# Patient Record
Sex: FEMALE | HISPANIC OR LATINO | Employment: FULL TIME | URBAN - METROPOLITAN AREA
[De-identification: names, ages, dates, MRNs, and addresses within clinical notes are randomized per-mention and may not be internally consistent; named-entity substitution may affect disease eponyms.]

---

## 2022-07-24 ENCOUNTER — HOSPITAL ENCOUNTER (EMERGENCY)
Facility: HOSPITAL | Age: 57
Discharge: HOME | End: 2022-07-24
Attending: STUDENT IN AN ORGANIZED HEALTH CARE EDUCATION/TRAINING PROGRAM | Admitting: STUDENT IN AN ORGANIZED HEALTH CARE EDUCATION/TRAINING PROGRAM

## 2022-07-24 ENCOUNTER — APPOINTMENT (OUTPATIENT)
Dept: CT IMAGING | Facility: HOSPITAL | Age: 57
End: 2022-07-24

## 2022-07-24 VITALS
RESPIRATION RATE: 18 BRPM | SYSTOLIC BLOOD PRESSURE: 144 MMHG | BODY MASS INDEX: 26.61 KG/M2 | HEART RATE: 66 BPM | TEMPERATURE: 98.1 F | OXYGEN SATURATION: 97 % | HEIGHT: 62 IN | DIASTOLIC BLOOD PRESSURE: 79 MMHG | WEIGHT: 144.62 LBS

## 2022-07-24 DIAGNOSIS — R10.32 LEFT LOWER QUADRANT ABDOMINAL PAIN: ICD-10-CM

## 2022-07-24 DIAGNOSIS — A08.4 VIRAL GASTROENTERITIS: Primary | ICD-10-CM

## 2022-07-24 DIAGNOSIS — N20.0 BILATERAL NEPHROLITHIASIS: ICD-10-CM

## 2022-07-24 LAB
ALBUMIN SERPL-MCNC: 4.2 G/DL (ref 3.5–5.2)
ALBUMIN/GLOB SERPL: 1.4 G/DL
ALP SERPL-CCNC: 165 U/L (ref 39–117)
ALT SERPL W P-5'-P-CCNC: 25 U/L (ref 1–33)
ANION GAP SERPL CALCULATED.3IONS-SCNC: 11 MMOL/L (ref 5–15)
AST SERPL-CCNC: 19 U/L (ref 1–32)
BASOPHILS # BLD AUTO: 0.04 10*3/MM3 (ref 0–0.2)
BASOPHILS NFR BLD AUTO: 0.4 % (ref 0–1.5)
BILIRUB SERPL-MCNC: 0.4 MG/DL (ref 0–1.2)
BILIRUB UR QL STRIP: NEGATIVE
BUN SERPL-MCNC: 15 MG/DL (ref 6–20)
BUN/CREAT SERPL: 17.9 (ref 7–25)
CALCIUM SPEC-SCNC: 9.7 MG/DL (ref 8.6–10.5)
CHLORIDE SERPL-SCNC: 105 MMOL/L (ref 98–107)
CLARITY UR: CLEAR
CO2 SERPL-SCNC: 24 MMOL/L (ref 22–29)
COLOR UR: YELLOW
CREAT SERPL-MCNC: 0.84 MG/DL (ref 0.57–1)
D-LACTATE SERPL-SCNC: 1.8 MMOL/L (ref 0.5–2)
DEPRECATED RDW RBC AUTO: 42.5 FL (ref 37–54)
EGFRCR SERPLBLD CKD-EPI 2021: 81.7 ML/MIN/1.73
EOSINOPHIL # BLD AUTO: 0.12 10*3/MM3 (ref 0–0.4)
EOSINOPHIL NFR BLD AUTO: 1.3 % (ref 0.3–6.2)
ERYTHROCYTE [DISTWIDTH] IN BLOOD BY AUTOMATED COUNT: 13 % (ref 12.3–15.4)
GLOBULIN UR ELPH-MCNC: 3 GM/DL
GLUCOSE SERPL-MCNC: 150 MG/DL (ref 65–99)
GLUCOSE UR STRIP-MCNC: ABNORMAL MG/DL
HCT VFR BLD AUTO: 44.4 % (ref 34–46.6)
HGB BLD-MCNC: 14.6 G/DL (ref 12–15.9)
HGB UR QL STRIP.AUTO: NEGATIVE
HOLD SPECIMEN: NORMAL
HOLD SPECIMEN: NORMAL
IMM GRANULOCYTES # BLD AUTO: 0.02 10*3/MM3 (ref 0–0.05)
IMM GRANULOCYTES NFR BLD AUTO: 0.2 % (ref 0–0.5)
KETONES UR QL STRIP: NEGATIVE
LEUKOCYTE ESTERASE UR QL STRIP.AUTO: NEGATIVE
LIPASE SERPL-CCNC: 27 U/L (ref 13–60)
LYMPHOCYTES # BLD AUTO: 2.67 10*3/MM3 (ref 0.7–3.1)
LYMPHOCYTES NFR BLD AUTO: 29.6 % (ref 19.6–45.3)
MCH RBC QN AUTO: 29.5 PG (ref 26.6–33)
MCHC RBC AUTO-ENTMCNC: 32.9 G/DL (ref 31.5–35.7)
MCV RBC AUTO: 89.7 FL (ref 79–97)
MONOCYTES # BLD AUTO: 0.54 10*3/MM3 (ref 0.1–0.9)
MONOCYTES NFR BLD AUTO: 6 % (ref 5–12)
NEUTROPHILS NFR BLD AUTO: 5.62 10*3/MM3 (ref 1.7–7)
NEUTROPHILS NFR BLD AUTO: 62.5 % (ref 42.7–76)
NITRITE UR QL STRIP: NEGATIVE
NRBC BLD AUTO-RTO: 0 /100 WBC (ref 0–0.2)
PH UR STRIP.AUTO: 5.5 [PH] (ref 5–8)
PLATELET # BLD AUTO: 314 10*3/MM3 (ref 140–450)
PMV BLD AUTO: 10.5 FL (ref 6–12)
POTASSIUM SERPL-SCNC: 3.7 MMOL/L (ref 3.5–5.2)
PROT SERPL-MCNC: 7.2 G/DL (ref 6–8.5)
PROT UR QL STRIP: NEGATIVE
RBC # BLD AUTO: 4.95 10*6/MM3 (ref 3.77–5.28)
SODIUM SERPL-SCNC: 140 MMOL/L (ref 136–145)
SP GR UR STRIP: >1.03 (ref 1–1.03)
UROBILINOGEN UR QL STRIP: ABNORMAL
WBC NRBC COR # BLD: 9.01 10*3/MM3 (ref 3.4–10.8)
WHOLE BLOOD HOLD COAG: NORMAL
WHOLE BLOOD HOLD SPECIMEN: NORMAL

## 2022-07-24 PROCEDURE — 0 IOPAMIDOL PER 1 ML: Performed by: STUDENT IN AN ORGANIZED HEALTH CARE EDUCATION/TRAINING PROGRAM

## 2022-07-24 PROCEDURE — 85025 COMPLETE CBC W/AUTO DIFF WBC: CPT

## 2022-07-24 PROCEDURE — 80053 COMPREHEN METABOLIC PANEL: CPT

## 2022-07-24 PROCEDURE — 74177 CT ABD & PELVIS W/CONTRAST: CPT

## 2022-07-24 PROCEDURE — 99283 EMERGENCY DEPT VISIT LOW MDM: CPT

## 2022-07-24 PROCEDURE — 36415 COLL VENOUS BLD VENIPUNCTURE: CPT

## 2022-07-24 PROCEDURE — 83605 ASSAY OF LACTIC ACID: CPT

## 2022-07-24 PROCEDURE — 83690 ASSAY OF LIPASE: CPT

## 2022-07-24 PROCEDURE — 81003 URINALYSIS AUTO W/O SCOPE: CPT | Performed by: STUDENT IN AN ORGANIZED HEALTH CARE EDUCATION/TRAINING PROGRAM

## 2022-07-24 RX ORDER — SODIUM CHLORIDE 0.9 % (FLUSH) 0.9 %
10 SYRINGE (ML) INJECTION AS NEEDED
Status: DISCONTINUED | OUTPATIENT
Start: 2022-07-24 | End: 2022-07-24 | Stop reason: HOSPADM

## 2022-07-24 RX ORDER — ONDANSETRON 4 MG/1
4 TABLET, ORALLY DISINTEGRATING ORAL 4 TIMES DAILY PRN
Qty: 12 TABLET | Refills: 0 | Status: SHIPPED | OUTPATIENT
Start: 2022-07-24

## 2022-07-24 RX ORDER — LOPERAMIDE HYDROCHLORIDE 2 MG/1
2 CAPSULE ORAL 4 TIMES DAILY PRN
Qty: 20 CAPSULE | Refills: 0 | Status: SHIPPED | OUTPATIENT
Start: 2022-07-24 | End: 2022-09-21

## 2022-07-24 RX ADMIN — IOPAMIDOL 100 ML: 755 INJECTION, SOLUTION INTRAVENOUS at 19:52

## 2022-07-24 NOTE — ED PROVIDER NOTES
Time: 7:35 PM EDT  Arrived by: private car  Chief Complaint: Abdominal pain, vomiting, diarrhea  History provided by: Patient  History is limited by: N/A     History of Present Illness:  Patient is a 56 y.o. year old female who presents to the emergency department with abdominal pain, vomiting, diarrhea.    Patient presents the emergency department today complaining of nausea, vomiting, diarrhea, and abdominal pain for the past 3 weeks.  Patient states the abdominal pain is located in the epigastric area and also on the left lower quadrant.  Patient states every time she eats she is having episodes of diarrhea.  Patient states she has vomited 3 times within the past 3 days.  Patient admits to weight loss of greater than 12 pounds since onset of symptoms 3 weeks ago.  Patient states she has normal appetite, but immediately after eating she feels as if she needs to vomit or immediately have a bowel movement.  Patient admits to cholecystectomy 2 years ago.  No other complaints.      History provided by:  Patient   used: No    Nausea  The primary symptoms include weight loss, abdominal pain, nausea, vomiting and diarrhea. Primary symptoms do not include fever, fatigue, melena, hematemesis, hematochezia, dysuria, arthralgias or rash. The illness began more than 7 days ago.   The illness does not include chills, anorexia or constipation.   Vomiting  The primary symptoms include weight loss, abdominal pain, nausea, vomiting and diarrhea. Primary symptoms do not include fever, fatigue, melena, hematemesis, hematochezia, dysuria, arthralgias or rash. The illness began more than 7 days ago.   The illness does not include chills, anorexia or constipation.   Abdominal Pain  Pain location:  Epigastric and LLQ  Pain quality: aching    Pain radiates to:  Does not radiate  Pain severity:  Mild  Onset quality:  Gradual  Duration: 3 weeks.  Timing:  Constant  Progression:  Unchanged  Relieved by:   "Nothing  Worsened by:  Eating  Associated symptoms: diarrhea, nausea and vomiting    Associated symptoms: no anorexia, no belching, no chest pain, no chills, no constipation, no cough, no dysuria, no fatigue, no fever, no flatus, no hematemesis, no hematochezia, no hematuria, no melena, no shortness of breath and no sore throat    Diarrhea  The primary symptoms include weight loss, abdominal pain, nausea, vomiting and diarrhea. Primary symptoms do not include fever, fatigue, melena, hematemesis, hematochezia, dysuria, arthralgias or rash. The illness began more than 7 days ago.   The illness does not include chills, anorexia or constipation.       Similar Symptoms Previously: No  Recently seen: No      Patient Care Team  Primary Care Provider: Carolynn Yanez APRN    Past Medical History:     No Known Allergies  No past medical history on file.  No past surgical history on file.  No family history on file.    Home Medications:  Prior to Admission medications    Not on File        Social History:      Recent travel: no     Review of Systems:  Review of Systems   Constitutional: Positive for weight loss. Negative for chills, fatigue and fever.   HENT: Negative for ear pain and sore throat.    Eyes: Negative for pain.   Respiratory: Negative for cough and shortness of breath.    Cardiovascular: Negative for chest pain.   Gastrointestinal: Positive for abdominal pain, diarrhea, nausea and vomiting. Negative for anorexia, constipation, flatus, hematemesis, hematochezia and melena.   Genitourinary: Negative for dysuria and hematuria.   Musculoskeletal: Negative for arthralgias.   Skin: Negative for rash.   Neurological: Negative for headaches.        Physical Exam:  /79   Pulse 66   Temp 98.1 °F (36.7 °C) (Oral)   Resp 18   Ht 157.5 cm (62\")   Wt 65.6 kg (144 lb 10 oz)   SpO2 97%   BMI 26.45 kg/m²     Physical Exam  Vitals and nursing note reviewed.   Constitutional:       General: She is not in acute " distress.     Appearance: Normal appearance. She is well-developed. She is not ill-appearing.   HENT:      Head: Normocephalic and atraumatic.      Nose: Nose normal.   Eyes:      Extraocular Movements: Extraocular movements intact.      Conjunctiva/sclera: Conjunctivae normal.      Pupils: Pupils are equal, round, and reactive to light.   Cardiovascular:      Rate and Rhythm: Normal rate and regular rhythm.      Heart sounds: Normal heart sounds.   Pulmonary:      Effort: Pulmonary effort is normal.      Breath sounds: Normal breath sounds.   Abdominal:      General: Abdomen is flat. Bowel sounds are normal.      Palpations: Abdomen is soft.      Tenderness: There is abdominal tenderness in the left lower quadrant. There is no guarding or rebound.      Hernia: No hernia is present.   Musculoskeletal:         General: Normal range of motion.      Cervical back: Normal range of motion and neck supple.   Skin:     General: Skin is warm and dry.   Neurological:      General: No focal deficit present.      Mental Status: She is alert and oriented to person, place, and time.   Psychiatric:         Mood and Affect: Mood normal.         Behavior: Behavior normal.         Thought Content: Thought content normal.         Judgment: Judgment normal.                Medications in the Emergency Department:  Medications   sodium chloride 0.9 % flush 10 mL (has no administration in time range)   iopamidol (ISOVUE-370) 76 % injection 100 mL (100 mL Intravenous Given 7/24/22 1952)        Labs  Lab Results (last 24 hours)     Procedure Component Value Units Date/Time    CBC & Differential [[id number]]  (Normal) Collected: 07/24/22 1708    Specimen: Blood Updated: 07/24/22 1723    Narrative:      The following orders were created for panel order CBC & Differential.  Procedure                               Abnormality         Status                     ---------                               -----------         ------                      CBC Auto Differential[[id number]]        Normal              Final result                 Please view results for these tests on the individual orders.    Comprehensive Metabolic Panel [[id number]]  (Abnormal) Collected: 07/24/22 1708    Specimen: Blood Updated: 07/24/22 1746     Glucose 150 mg/dL      BUN 15 mg/dL      Creatinine 0.84 mg/dL      Sodium 140 mmol/L      Potassium 3.7 mmol/L      Chloride 105 mmol/L      CO2 24.0 mmol/L      Calcium 9.7 mg/dL      Total Protein 7.2 g/dL      Albumin 4.20 g/dL      ALT (SGPT) 25 U/L      AST (SGOT) 19 U/L      Alkaline Phosphatase 165 U/L      Total Bilirubin 0.4 mg/dL      Globulin 3.0 gm/dL      A/G Ratio 1.4 g/dL      BUN/Creatinine Ratio 17.9     Anion Gap 11.0 mmol/L      eGFR 81.7 mL/min/1.73      Comment: National Kidney Foundation and American Society of Nephrology (ASN) Task Force recommended calculation based on the Chronic Kidney Disease Epidemiology Collaboration (CKD-EPI) equation refit without adjustment for race.       Narrative:      GFR Normal >60  Chronic Kidney Disease <60  Kidney Failure <15      Lipase [[id number]]  (Normal) Collected: 07/24/22 1708    Specimen: Blood Updated: 07/24/22 1746     Lipase 27 U/L     Lactic Acid, Plasma [[id number]]  (Normal) Collected: 07/24/22 1708    Specimen: Blood Updated: 07/24/22 1740     Lactate 1.8 mmol/L     CBC Auto Differential [[id number]]  (Normal) Collected: 07/24/22 1708    Specimen: Blood Updated: 07/24/22 1723     WBC 9.01 10*3/mm3      RBC 4.95 10*6/mm3      Hemoglobin 14.6 g/dL      Hematocrit 44.4 %      MCV 89.7 fL      MCH 29.5 pg      MCHC 32.9 g/dL      RDW 13.0 %      RDW-SD 42.5 fl      MPV 10.5 fL      Platelets 314 10*3/mm3      Neutrophil % 62.5 %      Lymphocyte % 29.6 %      Monocyte % 6.0 %      Eosinophil % 1.3 %      Basophil % 0.4 %      Immature Grans % 0.2 %      Neutrophils, Absolute 5.62 10*3/mm3      Lymphocytes, Absolute 2.67 10*3/mm3      Monocytes, Absolute 0.54 10*3/mm3       Eosinophils, Absolute 0.12 10*3/mm3      Basophils, Absolute 0.04 10*3/mm3      Immature Grans, Absolute 0.02 10*3/mm3      nRBC 0.0 /100 WBC     Urinalysis With Microscopic If Indicated (No Culture) - Urine, Clean Catch [[id number]]  (Abnormal) Collected: 07/24/22 2024    Specimen: Urine, Clean Catch Updated: 07/24/22 2035     Color, UA Yellow     Appearance, UA Clear     pH, UA 5.5     Specific Gravity, UA >1.030     Glucose, UA >=1000 mg/dL (3+)     Ketones, UA Negative     Bilirubin, UA Negative     Blood, UA Negative     Protein, UA Negative     Leuk Esterase, UA Negative     Nitrite, UA Negative     Urobilinogen, UA 1.0 E.U./dL    Narrative:      Urine microscopic not indicated.           Imaging:  CT Abdomen Pelvis With Contrast    Result Date: 7/24/2022  PROCEDURE: CT ABDOMEN PELVIS W CONTRAST  COMPARISON: None.  INDICATIONS: EPIGASTRIC ABDOMINAL PAIN & LEFT LOWER QUADRANT ABDOMINAL PAIN.  TECHNIQUE: After obtaining the patient's consent, 604 CT images were created with non-ionic intravenous contrast material.  No oral contrast agent was administered for the study.  PROTOCOL:   Standard enhanced CT imaging protocol performed.    RADIATION:   Total DLP: 688mGy*cm.   Automated exposure control was utilized to minimize radiation dose. CONTRAST: 80 mL Isovue 370 I.V. LABS:   eGFR: >60 mL/min/1.73m^2  FINDINGS: No acute findings are identified.  The patient has undergone cholecystectomy.  No acute pancreatitis.  No biliary ductal dilatation.  No mechanical bowel obstruction.  No pathologic bowel wall thickening.  No pneumoperitoneum or pneumatosis.  No portal or mesenteric venous gas.  The appendix is not clearly identified.  Please correlate with the surgical history.  No acute inflammatory changes are seen in the right lower quadrant in the pericecal region.  No acute colitis or diverticulitis.  There are scattered colonic diverticula.  There are renal cysts, which measure about 4.4 cm or less in size.  The  largest involves the posterior upper pole of the right kidney and has benign CT number readings.  No suspicious renal mass is appreciated.  Nonobstructing nephrolithiasis is present on the left with calyceal stones measuring about 7 mm in greatest diameter.  There may be renal cortical scarring bilaterally.  A tiny lower pole calyceal stone is suggested on the right, measuring about 3 mm.  No ureterolithiasis.  No hydronephrosis.  No acute pyelonephritis.  Surgical clips are identified in the right retroperitoneum.  No ascites.  No aneurysmal dilatation of the aortoiliac arterial system.  No acute intraperitoneal or retroperitoneal hemorrhage.  No enlarged intra-abdominal or intrapelvic lymph nodes.  No adrenal mass.  No acute findings are seen with regard to the liver or spleen.  There is diffuse hepatic steatosis without hepatomegaly.  No splenomegaly.  No acute fracture.  No aggressive osseous lesion.  There is possible mild levoscoliosis of the lumbar spine versus positional artifact.  Degenerative changes involve the sacroiliac joints, greater on the right.  There is pubic osteitis.  There are probably benign bone islands involving the left acetabulum and the proximal left femur, estimated at about 1 cm in greatest diameter.  No acute infiltrate is seen in the partially imaged lung bases.  There is chronic calcified granulomatous disease of the chest.  No suspicious uterine or adnexal mass.  There may have been prior bilateral tubal ligation.  No urinary bladder calculi are seen.        No acute findings are seen in the abdomen or pelvis by enhanced CT examination.  Bilateral nonobstructing nephrolithiasis is appreciated.  Please see above comments for further detail.    COMMENT:  Part of this note is an electronic transcription of spoken language to printed text. The electronic translation/transcription may permit erroneous, or at times, nonsensical (or even sensical) words or phrases to be inadvertently  transcribed or omitted; this  has reviewed the note for such errors (as well as additional errors); however, some may still exist.  ELLIE GIBBS JR, MD       Electronically Signed and Approved By: ELLIE GIBBS JR, MD on 7/24/2022 at 20:14                Procedures:  Procedures    Progress                            Medical Decision Making:  MDM  Number of Diagnoses or Management Options  Diagnosis management comments: I have spoken with patient. I have explained the patient´s condition, diagnoses and treatment plan based on the information available to me at this time. I have answered the patient's questions and addressed any concerns. The patient has a good  understanding of the patient´s diagnosis, condition, and treatment plan as can be expected at this point. The vital signs have been stable. The patient´s condition is stable and appropriate for discharge from the emergency department.      The patient will pursue further outpatient evaluation with the primary care physician or other designated or consulting physician as outlined in the discharge instructions. They are agreeable to this plan of care and follow-up instructions have been explained in detail. The patient has received these instructions in written format and have expressed an understanding of the discharge instructions. The patient is aware that any significant change in condition or worsening of symptoms should prompt an immediate return to this or the closest emergency department or call to 911.       Amount and/or Complexity of Data Reviewed  Clinical lab tests: reviewed and ordered  Tests in the radiology section of CPT®: ordered and reviewed    Risk of Complications, Morbidity, and/or Mortality  Presenting problems: moderate  Diagnostic procedures: low  Management options: low    Patient Progress  Patient progress: stable       Final diagnoses:   Viral gastroenteritis   Left lower quadrant abdominal pain   Bilateral  nephrolithiasis        Disposition:  ED Disposition     ED Disposition   Discharge    Condition   Stable    Comment   --             This medical record created using voice recognition software.           Noé Capone PA-C  07/24/22 [id number]

## 2022-07-25 NOTE — DISCHARGE INSTRUCTIONS
Take Zofran as needed for nausea.  Take Imodium as instructed for diarrhea.  I have attached a handout of food choices that may help relieve your diarrhea as well.

## 2022-09-21 ENCOUNTER — OFFICE VISIT (OUTPATIENT)
Dept: GASTROENTEROLOGY | Facility: CLINIC | Age: 57
End: 2022-09-21

## 2022-09-21 VITALS
SYSTOLIC BLOOD PRESSURE: 141 MMHG | BODY MASS INDEX: 26.31 KG/M2 | WEIGHT: 143 LBS | HEIGHT: 62 IN | HEART RATE: 76 BPM | DIASTOLIC BLOOD PRESSURE: 92 MMHG

## 2022-09-21 DIAGNOSIS — K21.9 GASTROESOPHAGEAL REFLUX DISEASE, UNSPECIFIED WHETHER ESOPHAGITIS PRESENT: ICD-10-CM

## 2022-09-21 DIAGNOSIS — R19.7 DIARRHEA, UNSPECIFIED TYPE: Primary | ICD-10-CM

## 2022-09-21 PROBLEM — E11.9 TYPE 2 DIABETES MELLITUS WITHOUT COMPLICATION: Status: ACTIVE | Noted: 2022-09-21

## 2022-09-21 PROBLEM — I10 ESSENTIAL HYPERTENSION: Status: ACTIVE | Noted: 2022-09-21

## 2022-09-21 PROBLEM — E03.9 HYPOTHYROIDISM: Status: ACTIVE | Noted: 2022-09-21

## 2022-09-21 PROBLEM — E78.2 MIXED HYPERLIPIDEMIA: Status: ACTIVE | Noted: 2022-09-21

## 2022-09-21 PROCEDURE — 99204 OFFICE O/P NEW MOD 45 MIN: CPT | Performed by: NURSE PRACTITIONER

## 2022-09-21 RX ORDER — CANAGLIFLOZIN 300 MG/1
TABLET, FILM COATED ORAL
COMMUNITY
Start: 2022-09-17

## 2022-09-21 RX ORDER — LEVOTHYROXINE SODIUM 0.05 MG/1
TABLET ORAL
COMMUNITY
Start: 2022-07-20

## 2022-09-21 RX ORDER — PANTOPRAZOLE SODIUM 40 MG/1
40 TABLET, DELAYED RELEASE ORAL DAILY
Qty: 90 TABLET | Refills: 1 | Status: SHIPPED | OUTPATIENT
Start: 2022-09-21

## 2022-09-21 RX ORDER — SOD SULF/POT CHLORIDE/MAG SULF 1.479 G
12 TABLET ORAL TAKE AS DIRECTED
Qty: 24 TABLET | Refills: 0 | Status: SHIPPED | OUTPATIENT
Start: 2022-09-21

## 2022-09-21 RX ORDER — DULAGLUTIDE 4.5 MG/.5ML
INJECTION, SOLUTION SUBCUTANEOUS
COMMUNITY
Start: 2022-08-24

## 2022-09-21 RX ORDER — DICYCLOMINE HCL 20 MG
20 TABLET ORAL EVERY 6 HOURS PRN
Qty: 120 TABLET | Refills: 1 | Status: SHIPPED | OUTPATIENT
Start: 2022-09-21 | End: 2022-11-23

## 2022-09-21 RX ORDER — MONTELUKAST SODIUM 4 MG/1
TABLET, CHEWABLE ORAL
COMMUNITY
Start: 2022-08-23

## 2022-09-21 RX ORDER — PANTOPRAZOLE SODIUM 40 MG/1
40 TABLET, DELAYED RELEASE ORAL DAILY
COMMUNITY
End: 2022-09-21 | Stop reason: SDUPTHER

## 2022-09-21 RX ORDER — LISINOPRIL 20 MG/1
TABLET ORAL
COMMUNITY
Start: 2022-07-26

## 2022-09-21 NOTE — PROGRESS NOTES
Chief Complaint     Diarrhea    History of Present Illness     Rossy Valdivia is a 57 y.o. female who presents to [facility] on referral from No ref. provider found for a gastroenterology evaluation of diarrhea.      She reports diarrhea that's been present for greater than 20 years.  This is not present daily.  Can occur up to 2-3 times per week.  When present will have 2-3 diarrhea bowel movements per day.  Denies rectal bleeding.  She's taking colestid when she has diarrhea BID.  She feels that it helps some.      Admits intermittent lower abdominal pain when diarrhea is present.      Reports heartburn that's been present for many years.  Reports improvement with protonix.       History      Past Medical History:   Diagnosis Date   • Diabetes mellitus (HCC)    • GERD (gastroesophageal reflux disease)    • Hypertension        History reviewed. No pertinent surgical history.    Family History   Problem Relation Age of Onset   • Colon cancer Neg Hx         Current Medications        Current Outpatient Medications:   •  Invokana 300 MG tablet tablet, , Disp: , Rfl:   •  levothyroxine (SYNTHROID, LEVOTHROID) 50 MCG tablet, TAKE 1 TABLET BY MOUTH EVERY DAY FOR 90 DAYS, Disp: , Rfl:   •  lisinopril (PRINIVIL,ZESTRIL) 20 MG tablet, TAKE 1 TABLET BY MOUTH EVERY DAY FOR 90 DAYS, Disp: , Rfl:   •  ondansetron ODT (ZOFRAN-ODT) 4 MG disintegrating tablet, Place 1 tablet on the tongue 4 (Four) Times a Day As Needed for Nausea or Vomiting., Disp: 12 tablet, Rfl: 0  •  pantoprazole (PROTONIX) 40 MG EC tablet, Take 1 tablet by mouth Daily., Disp: 90 tablet, Rfl: 1  •  Trulicity 4.5 MG/0.5ML solution pen-injector, , Disp: , Rfl:   •  colestipol (COLESTID) 1 g tablet, , Disp: , Rfl:   •  dicyclomine (BENTYL) 20 MG tablet, Take 1 tablet by mouth Every 6 (Six) Hours As Needed (abdominal pain and diarrhea)., Disp: 120 tablet, Rfl: 1  •  Sodium Sulfate-Mag Sulfate-KCl (Sutab) 6207-096-143 MG tablet,  "Take 12 tablets by mouth Take As Directed. Take 12 tablets at 6 pm and 12 tablets 4 hours prior to procedure., Disp: 24 tablet, Rfl: 0     Allergies     No Known Allergies    Social History       Social History     Social History Narrative   • Not on file       Immunizations     Immunization:    There is no immunization history on file for this patient.       Objective     Objective     Vital Signs:   /92 (BP Location: Left arm, Patient Position: Sitting)   Pulse 76   Ht 157.5 cm (62\")   Wt 64.9 kg (143 lb)   BMI 26.16 kg/m²       Physical Exam  Constitutional:       General: She is not in acute distress.     Appearance: Normal appearance. She is well-developed and normal weight.   HENT:      Head: Normocephalic and atraumatic.   Eyes:      Conjunctiva/sclera: Conjunctivae normal.      Pupils: Pupils are equal, round, and reactive to light.      Visual Fields: Right eye visual fields normal and left eye visual fields normal.   Cardiovascular:      Rate and Rhythm: Normal rate and regular rhythm.      Heart sounds: Normal heart sounds.   Pulmonary:      Effort: Pulmonary effort is normal. No retractions.      Breath sounds: Normal breath sounds and air entry.   Abdominal:      General: Bowel sounds are normal.      Palpations: Abdomen is soft.      Tenderness: There is no abdominal tenderness.      Comments: No appreciable hepatosplenomegaly or ascites   Musculoskeletal:         General: Normal range of motion.      Cervical back: Neck supple.      Right lower leg: No edema.      Left lower leg: No edema.   Lymphadenopathy:      Cervical: No cervical adenopathy.   Skin:     General: Skin is warm and dry.      Findings: No lesion.   Neurological:      General: No focal deficit present.      Mental Status: She is alert and oriented to person, place, and time.   Psychiatric:         Mood and Affect: Mood and affect normal.         Behavior: Behavior normal.         Results      Result Review :   The following " data was reviewed by: AMANDA Chapa on 09/21/2022:    CBC w/diff    CBC w/Diff 7/24/22   WBC 9.01   RBC 4.95   Hemoglobin 14.6   Hematocrit 44.4   MCV 89.7   MCH 29.5   MCHC 32.9   RDW 13.0   Platelets 314   Neutrophil Rel % 62.5   Immature Granulocyte Rel % 0.2   Lymphocyte Rel % 29.6   Monocyte Rel % 6.0   Eosinophil Rel % 1.3   Basophil Rel % 0.4           CMP    CMP 7/24/22   Glucose 150 (A)   BUN 15   Creatinine 0.84   Sodium 140   Potassium 3.7   Chloride 105   Calcium 9.7   Albumin 4.20   Total Bilirubin 0.4   Alkaline Phosphatase 165 (A)   AST (SGOT) 19   ALT (SGPT) 25   (A) Abnormal value                     Assessment and Plan        Assessment and Plan    Diagnoses and all orders for this visit:    1. Diarrhea, unspecified type (Primary)  -     Clostridioides difficile Toxin - Stool, Per Rectum; Future  -     Enteric Bacterial Panel - Stool, Per Rectum; Future  -     Enteric Parasite Panel - Stool, Per Rectum; Future  -     Occult Blood, Fecal By Immunoassay - Stool, Per Rectum; Future  -     Fecal Lactoferrin Qual. - Stool, Per Rectum; Future  -     Pancreatic Elastase, Fecal - Stool, Per Rectum; Future  -     Case Request; Standing  -     Case Request  -     dicyclomine (BENTYL) 20 MG tablet; Take 1 tablet by mouth Every 6 (Six) Hours As Needed (abdominal pain and diarrhea).  Dispense: 120 tablet; Refill: 1    2. Gastroesophageal reflux disease, unspecified whether esophagitis present  -     pantoprazole (PROTONIX) 40 MG EC tablet; Take 1 tablet by mouth Daily.  Dispense: 90 tablet; Refill: 1  -     Case Request; Standing  -     Case Request    Other orders  -     Follow Anesthesia Guidelines / Protocol; Future  -     Sodium Sulfate-Mag Sulfate-KCl (Sutab) 9239-561-073 MG tablet; Take 12 tablets by mouth Take As Directed. Take 12 tablets at 6 pm and 12 tablets 4 hours prior to procedure.  Dispense: 24 tablet; Refill: 0        ESOPHAGOGASTRODUODENOSCOPY (N/A), COLONOSCOPY (N/A)  The risk of  the endoscopy were discussed in detail. Possible risks/complications, benefits, and alternatives to surgical or invasive procedure have been explained to patient and/or legal guardian; risks include bleeding, infection, and perforation. Patient has been evaluated and can tolerate anesthesia and/or sedation.       Follow Up        Follow Up   Return for F/U after procedure.  Patient was given instructions and counseling regarding her condition or for health maintenance advice. Please see specific information pulled into the AVS if appropriate.

## 2022-10-03 ENCOUNTER — LAB (OUTPATIENT)
Dept: LAB | Facility: HOSPITAL | Age: 57
End: 2022-10-03

## 2022-10-03 DIAGNOSIS — R19.7 DIARRHEA, UNSPECIFIED TYPE: ICD-10-CM

## 2022-10-03 LAB
027 TOXIN: NORMAL
C DIFF TOX GENS STL QL NAA+PROBE: NEGATIVE
HEMOCCULT STL QL IA: NEGATIVE
LACTOFERRIN STL QL LA: NEGATIVE

## 2022-10-03 PROCEDURE — 82274 ASSAY TEST FOR BLOOD FECAL: CPT

## 2022-10-03 PROCEDURE — 83630 LACTOFERRIN FECAL (QUAL): CPT

## 2022-10-03 PROCEDURE — 87506 IADNA-DNA/RNA PROBE TQ 6-11: CPT

## 2022-10-03 PROCEDURE — 87493 C DIFF AMPLIFIED PROBE: CPT

## 2022-10-03 PROCEDURE — 82653 EL-1 FECAL QUANTITATIVE: CPT

## 2022-10-04 LAB
C COLI+JEJ+UPSA DNA STL QL NAA+NON-PROBE: NOT DETECTED
CRYPTOSP DNA STL QL NAA+NON-PROBE: NOT DETECTED
E HISTOLYT DNA STL QL NAA+NON-PROBE: NOT DETECTED
EC STX1+STX2 GENES STL QL NAA+NON-PROBE: NOT DETECTED
G LAMBLIA DNA STL QL NAA+NON-PROBE: NOT DETECTED
S ENT+BONG DNA STL QL NAA+NON-PROBE: NOT DETECTED
SHIGELLA SP+EIEC IPAH ST NAA+NON-PROBE: NOT DETECTED

## 2022-10-08 LAB — ELASTASE PANC STL-MCNT: <50 UG ELAST./G

## 2022-10-10 ENCOUNTER — TELEPHONE (OUTPATIENT)
Dept: GASTROENTEROLOGY | Facility: CLINIC | Age: 57
End: 2022-10-10

## 2022-10-10 RX ORDER — PANCRELIPASE 36000; 180000; 114000 [USP'U]/1; [USP'U]/1; [USP'U]/1
36000 CAPSULE, DELAYED RELEASE PELLETS ORAL 4 TIMES DAILY
Qty: 360 CAPSULE | Refills: 1 | Status: SHIPPED | OUTPATIENT
Start: 2022-10-10 | End: 2023-01-08

## 2022-10-10 NOTE — TELEPHONE ENCOUNTER
----- Message from AMANDA Chapa sent at 10/10/2022  1:18 PM EDT -----  Please let patient know that stool studies are available pancreatic insufficiency.  This is likely contributing to her diarrhea.  Recommend pancreatic enzymes with each meal.  Rx sent to patient's pharmacy.

## 2022-11-02 ENCOUNTER — OFFICE VISIT (OUTPATIENT)
Dept: UROLOGY | Facility: CLINIC | Age: 57
End: 2022-11-02

## 2022-11-02 VITALS — WEIGHT: 142.4 LBS | HEIGHT: 62 IN | BODY MASS INDEX: 26.2 KG/M2 | RESPIRATION RATE: 16 BRPM

## 2022-11-02 DIAGNOSIS — N20.0 KIDNEY STONES: Primary | ICD-10-CM

## 2022-11-02 LAB
BILIRUB BLD-MCNC: NEGATIVE MG/DL
CLARITY, POC: CLEAR
COLOR UR: YELLOW
EXPIRATION DATE: ABNORMAL
GLUCOSE UR STRIP-MCNC: ABNORMAL MG/DL
KETONES UR QL: NEGATIVE
LEUKOCYTE EST, POC: NEGATIVE
Lab: ABNORMAL
NITRITE UR-MCNC: NEGATIVE MG/ML
PH UR: 5.5 [PH] (ref 5–8)
PROT UR STRIP-MCNC: NEGATIVE MG/DL
RBC # UR STRIP: ABNORMAL /UL
SP GR UR: 1.02 (ref 1–1.03)
UROBILINOGEN UR QL: ABNORMAL

## 2022-11-02 PROCEDURE — 99203 OFFICE O/P NEW LOW 30 MIN: CPT | Performed by: UROLOGY

## 2022-11-02 NOTE — PROGRESS NOTES
"Chief Complaint  Kidney stones    Subjective          Rossy Valdivia presents to [facility] UROLOGY  History of Present Illness     The patient states she is doing well. She reports she has had kidney stones since 1989. She states she does not typically pass them. The patient reports she has had surgery on her kidney a lot. She states her last surgery was in 03/2021 and 04/2021. The patient reports she had laser surgery on her left kidney and they had to put her in the hospital. She states she had another surgery on her right kidney because they were 17 mm. The patient reports she has been having pain for the last 2 days.    Objective   Vital Signs:   Resp 16   Ht 157.5 cm (62\")   Wt 64.6 kg (142 lb 6.4 oz)   BMI 26.05 kg/m²       Physical Exam  Vitals and nursing note reviewed.   Constitutional:       Appearance: Normal appearance. She is well-developed.   Pulmonary:      Effort: Pulmonary effort is normal.      Breath sounds: Normal air entry.   Neurological:      Mental Status: She is alert and oriented to person, place, and time.      Motor: Motor function is intact.   Psychiatric:         Mood and Affect: Mood normal.         Behavior: Behavior normal.          Result Review :       Results for orders placed or performed in visit on 11/02/22   POC Urinalysis Dipstick, Automated    Specimen: Urine   Result Value Ref Range    Color Yellow Yellow, Straw, Dark Yellow, Jennifer    Clarity, UA Clear Clear    Specific Gravity  1.025 1.005 - 1.030    pH, Urine 5.5 5.0 - 8.0    Leukocytes Negative Negative    Nitrite, UA Negative Negative    Protein, POC Negative Negative mg/dL    Glucose, UA >=1000 mg/dL (3+) (A) Negative mg/dL    Ketones, UA Negative Negative    Urobilinogen, UA 0.2 E.U./dL Normal, 0.2 E.U./dL    Bilirubin Negative Negative    Blood, UA Trace (A) Negative    Lot Number [id number]     Expiration Date 102,023        Data reviewed: Radiologic studies CT scan:   Study Result    Narrative & " Impression   PROCEDURE:  CT ABDOMEN PELVIS W CONTRAST     COMPARISON: None.     INDICATIONS:  EPIGASTRIC ABDOMINAL PAIN & LEFT LOWER QUADRANT ABDOMINAL PAIN.     TECHNIQUE:    After obtaining the patient's consent, 604 CT images were created with non-ionic   intravenous contrast material.  No oral contrast agent was administered for the study.     PROTOCOL:     Standard enhanced CT imaging protocol performed.                 RADIATION:      Total DLP: 688mGy*cm.               Automated exposure control was utilized to minimize radiation dose.   CONTRAST:      80 mL Isovue 370 I.V.  LABS:   eGFR: >60 mL/min/1.73m^2     FINDINGS:        No acute findings are identified.  The patient has undergone cholecystectomy.  No acute   pancreatitis.  No biliary ductal dilatation.  No mechanical bowel obstruction.  No pathologic bowel   wall thickening.  No pneumoperitoneum or pneumatosis.  No portal or mesenteric venous gas.  The   appendix is not clearly identified.  Please correlate with the surgical history.  No acute   inflammatory changes are seen in the right lower quadrant in the pericecal region.  No acute   colitis or diverticulitis.  There are scattered colonic diverticula.  There are renal cysts, which   measure about 4.4 cm or less in size.  The largest involves the posterior upper pole of the right   kidney and has benign CT number readings.  No suspicious renal mass is appreciated.  Nonobstructing   nephrolithiasis is present on the left with calyceal stones measuring about 7 mm in greatest   diameter.  There may be renal cortical scarring bilaterally.  A tiny lower pole calyceal stone is   suggested on the right, measuring about 3 mm.  No ureterolithiasis.  No hydronephrosis.  No acute   pyelonephritis.  Surgical clips are identified in the right retroperitoneum.  No ascites.  No   aneurysmal dilatation of the aortoiliac arterial system.  No acute intraperitoneal or   retroperitoneal hemorrhage.  No enlarged  intra-abdominal or intrapelvic lymph nodes.  No adrenal   mass.  No acute findings are seen with regard to the liver or spleen.  There is diffuse hepatic   steatosis without hepatomegaly.  No splenomegaly.  No acute fracture.  No aggressive osseous   lesion.  There is possible mild levoscoliosis of the lumbar spine versus positional artifact.    Degenerative changes involve the sacroiliac joints, greater on the right.  There is pubic osteitis.    There are probably benign bone islands involving the left acetabulum and the proximal left femur,   estimated at about 1 cm in greatest diameter.  No acute infiltrate is seen in the partially imaged   lung bases.  There is chronic calcified granulomatous disease of the chest.  No suspicious uterine   or adnexal mass.  There may have been prior bilateral tubal ligation.  No urinary bladder calculi   are seen.     IMPRESSION:                 No acute findings are seen in the abdomen or pelvis by enhanced CT examination.  Bilateral   nonobstructing nephrolithiasis is appreciated.  Please see above comments for further detail.           COMMENT:  Part of this note is an electronic transcription of spoken language to printed text. The   electronic translation/transcription may permit erroneous, or at times, nonsensical (or even   sensical) words or phrases to be inadvertently transcribed or omitted; this  has   reviewed the note for such errors (as well as additional errors); however, some may still exist.     ELLIE GIBBS JR, MD         Electronically Signed and Approved By: ELLIE GIBBS JR, MD on 7/24/2022 at 20:14               Assessment and Plan    Diagnoses and all orders for this visit:    1. Kidney stones (Primary)  Comments:  She will have a KUB,will call her with those results. If she is not passing any stones currently, we will plan on seeing her back in 6 months with a KUB.  Orders:  -     POC Urinalysis Dipstick, Automated  -     XR Abdomen KUB;  Future  -     XR Abdomen KUB; Future            Follow Up       Return in about 6 months (around 5/2/2023).  Patient was given instructions and counseling regarding her condition or for health maintenance advice. Please see specific information pulled into the AVS if appropriate.     Transcribed from ambient dictation for Patricia Garcia MD by Salome Bright.  11/02/22   14:34 EDT    Patient or patient representative verbalized consent to the visit recording.  I have personally performed the services described in this document as transcribed by the above individual, and it is both accurate and complete.

## 2022-11-07 ENCOUNTER — HOSPITAL ENCOUNTER (OUTPATIENT)
Dept: GENERAL RADIOLOGY | Facility: HOSPITAL | Age: 57
Discharge: HOME | End: 2022-11-07
Admitting: UROLOGY

## 2022-11-07 DIAGNOSIS — N20.0 KIDNEY STONES: ICD-10-CM

## 2022-11-07 PROCEDURE — 74018 RADEX ABDOMEN 1 VIEW: CPT

## 2022-11-23 DIAGNOSIS — R19.7 DIARRHEA, UNSPECIFIED TYPE: ICD-10-CM

## 2022-11-23 RX ORDER — DICYCLOMINE HCL 20 MG
20 TABLET ORAL EVERY 6 HOURS PRN
Qty: 120 TABLET | Refills: 1 | Status: SHIPPED | OUTPATIENT
Start: 2022-11-23

## 2022-12-14 ENCOUNTER — APPOINTMENT (OUTPATIENT)
Dept: GENERAL RADIOLOGY | Facility: HOSPITAL | Age: 57
End: 2022-12-14

## 2022-12-14 ENCOUNTER — HOSPITAL ENCOUNTER (EMERGENCY)
Facility: HOSPITAL | Age: 57
Discharge: HOME | End: 2022-12-14
Attending: EMERGENCY MEDICINE | Admitting: EMERGENCY MEDICINE

## 2022-12-14 VITALS
HEIGHT: 62 IN | BODY MASS INDEX: 26.29 KG/M2 | WEIGHT: 142.86 LBS | TEMPERATURE: 98.6 F | OXYGEN SATURATION: 99 % | RESPIRATION RATE: 20 BRPM | DIASTOLIC BLOOD PRESSURE: 87 MMHG | HEART RATE: 96 BPM | SYSTOLIC BLOOD PRESSURE: 133 MMHG

## 2022-12-14 DIAGNOSIS — J40 BRONCHITIS: Primary | ICD-10-CM

## 2022-12-14 LAB
ALBUMIN SERPL-MCNC: 3.9 G/DL (ref 3.5–5.2)
ALBUMIN/GLOB SERPL: 1.1 G/DL
ALP SERPL-CCNC: 180 U/L (ref 39–117)
ALT SERPL W P-5'-P-CCNC: 32 U/L (ref 1–33)
ANION GAP SERPL CALCULATED.3IONS-SCNC: 10.2 MMOL/L (ref 5–15)
AST SERPL-CCNC: 13 U/L (ref 1–32)
BASOPHILS # BLD AUTO: 0.03 10*3/MM3 (ref 0–0.2)
BASOPHILS NFR BLD AUTO: 0.3 % (ref 0–1.5)
BILIRUB SERPL-MCNC: 0.3 MG/DL (ref 0–1.2)
BUN SERPL-MCNC: 15 MG/DL (ref 6–20)
BUN/CREAT SERPL: 18.1 (ref 7–25)
CALCIUM SPEC-SCNC: 10.4 MG/DL (ref 8.6–10.5)
CHLORIDE SERPL-SCNC: 105 MMOL/L (ref 98–107)
CO2 SERPL-SCNC: 27.8 MMOL/L (ref 22–29)
CREAT SERPL-MCNC: 0.83 MG/DL (ref 0.57–1)
DEPRECATED RDW RBC AUTO: 41.9 FL (ref 37–54)
EGFRCR SERPLBLD CKD-EPI 2021: 82.3 ML/MIN/1.73
EOSINOPHIL # BLD AUTO: 0.11 10*3/MM3 (ref 0–0.4)
EOSINOPHIL NFR BLD AUTO: 1.1 % (ref 0.3–6.2)
ERYTHROCYTE [DISTWIDTH] IN BLOOD BY AUTOMATED COUNT: 12.8 % (ref 12.3–15.4)
FLUAV AG NPH QL: NEGATIVE
FLUBV AG NPH QL IA: NEGATIVE
GLOBULIN UR ELPH-MCNC: 3.4 GM/DL
GLUCOSE SERPL-MCNC: 260 MG/DL (ref 65–99)
HCT VFR BLD AUTO: 43.3 % (ref 34–46.6)
HGB BLD-MCNC: 14.1 G/DL (ref 12–15.9)
HOLD SPECIMEN: NORMAL
HOLD SPECIMEN: NORMAL
IMM GRANULOCYTES # BLD AUTO: 0.04 10*3/MM3 (ref 0–0.05)
IMM GRANULOCYTES NFR BLD AUTO: 0.4 % (ref 0–0.5)
LYMPHOCYTES # BLD AUTO: 2.62 10*3/MM3 (ref 0.7–3.1)
LYMPHOCYTES NFR BLD AUTO: 25.9 % (ref 19.6–45.3)
MCH RBC QN AUTO: 29.4 PG (ref 26.6–33)
MCHC RBC AUTO-ENTMCNC: 32.6 G/DL (ref 31.5–35.7)
MCV RBC AUTO: 90.4 FL (ref 79–97)
MONOCYTES # BLD AUTO: 0.79 10*3/MM3 (ref 0.1–0.9)
MONOCYTES NFR BLD AUTO: 7.8 % (ref 5–12)
NEUTROPHILS NFR BLD AUTO: 6.52 10*3/MM3 (ref 1.7–7)
NEUTROPHILS NFR BLD AUTO: 64.5 % (ref 42.7–76)
NRBC BLD AUTO-RTO: 0 /100 WBC (ref 0–0.2)
NT-PROBNP SERPL-MCNC: <36 PG/ML (ref 0–900)
PLATELET # BLD AUTO: 324 10*3/MM3 (ref 140–450)
PMV BLD AUTO: 11.4 FL (ref 6–12)
POTASSIUM SERPL-SCNC: 4.5 MMOL/L (ref 3.5–5.2)
PROT SERPL-MCNC: 7.3 G/DL (ref 6–8.5)
QT INTERVAL: 319 MS
RBC # BLD AUTO: 4.79 10*6/MM3 (ref 3.77–5.28)
SARS-COV-2 RNA PNL SPEC NAA+PROBE: NOT DETECTED
SODIUM SERPL-SCNC: 143 MMOL/L (ref 136–145)
TROPONIN T SERPL-MCNC: <0.01 NG/ML (ref 0–0.03)
WBC NRBC COR # BLD: 10.11 10*3/MM3 (ref 3.4–10.8)
WHOLE BLOOD HOLD COAG: NORMAL
WHOLE BLOOD HOLD SPECIMEN: NORMAL

## 2022-12-14 PROCEDURE — 87804 INFLUENZA ASSAY W/OPTIC: CPT

## 2022-12-14 PROCEDURE — 80053 COMPREHEN METABOLIC PANEL: CPT

## 2022-12-14 PROCEDURE — 93010 ELECTROCARDIOGRAM REPORT: CPT | Performed by: INTERNAL MEDICINE

## 2022-12-14 PROCEDURE — 36415 COLL VENOUS BLD VENIPUNCTURE: CPT

## 2022-12-14 PROCEDURE — U0004 COV-19 TEST NON-CDC HGH THRU: HCPCS

## 2022-12-14 PROCEDURE — 93005 ELECTROCARDIOGRAM TRACING: CPT

## 2022-12-14 PROCEDURE — 63710000001 PREDNISONE PER 5 MG: Performed by: NURSE PRACTITIONER

## 2022-12-14 PROCEDURE — 99283 EMERGENCY DEPT VISIT LOW MDM: CPT

## 2022-12-14 PROCEDURE — 71045 X-RAY EXAM CHEST 1 VIEW: CPT

## 2022-12-14 PROCEDURE — 84484 ASSAY OF TROPONIN QUANT: CPT

## 2022-12-14 PROCEDURE — 83880 ASSAY OF NATRIURETIC PEPTIDE: CPT

## 2022-12-14 PROCEDURE — 93005 ELECTROCARDIOGRAM TRACING: CPT | Performed by: EMERGENCY MEDICINE

## 2022-12-14 PROCEDURE — 85025 COMPLETE CBC W/AUTO DIFF WBC: CPT

## 2022-12-14 RX ORDER — PREDNISONE 20 MG/1
60 TABLET ORAL DAILY
Qty: 15 TABLET | Refills: 0 | Status: SHIPPED | OUTPATIENT
Start: 2022-12-14 | End: 2022-12-19

## 2022-12-14 RX ORDER — BENZONATATE 200 MG/1
200 CAPSULE ORAL 3 TIMES DAILY PRN
Qty: 20 CAPSULE | Refills: 0 | Status: SHIPPED | OUTPATIENT
Start: 2022-12-14

## 2022-12-14 RX ORDER — AZITHROMYCIN 250 MG/1
TABLET, FILM COATED ORAL
Qty: 6 TABLET | Refills: 0 | Status: SHIPPED | OUTPATIENT
Start: 2022-12-14

## 2022-12-14 RX ORDER — ALBUTEROL SULFATE 90 UG/1
2 AEROSOL, METERED RESPIRATORY (INHALATION) EVERY 6 HOURS PRN
Qty: 18 G | Refills: 0 | Status: SHIPPED | OUTPATIENT
Start: 2022-12-14

## 2022-12-14 RX ORDER — SODIUM CHLORIDE 0.9 % (FLUSH) 0.9 %
10 SYRINGE (ML) INJECTION AS NEEDED
Status: DISCONTINUED | OUTPATIENT
Start: 2022-12-14 | End: 2022-12-14 | Stop reason: HOSPADM

## 2022-12-14 RX ADMIN — PREDNISONE 60 MG: 50 TABLET ORAL at 05:30

## 2022-12-14 NOTE — DISCHARGE INSTRUCTIONS
Take medications as prescribed.    Follow-up with your PCP if no better    Return for new or worsening symptoms

## 2022-12-14 NOTE — ED PROVIDER NOTES
Time: 06:25 EST  Arrived by: Vehicle  Chief Complaint: Cough  History provided by: Patient  History is limited by: N/A    History of Present Illness:  Patient is a 57 y.o. year old female that presents to the emergency department with cough      History provided by:  Patient  Cough  Cough characteristics:  Non-productive  Severity:  Moderate  Onset quality:  Gradual  Duration:  2 weeks  Timing:  Constant  Progression:  Waxing and waning  Chronicity:  New  Smoker: no    Context: upper respiratory infection    Relieved by:  Nothing  Worsened by:  Activity, lying down and deep breathing  Ineffective treatments:  None tried  Associated symptoms: chest pain ( chest sore with cough), rhinorrhea, shortness of breath ( At times) and wheezing ( At times)    Associated symptoms: no chills, no diaphoresis, no ear fullness, no ear pain, no eye discharge, no fever, no headaches, no myalgias, no rash, no sinus congestion and no sore throat    Risk factors: no recent infection and no recent travel            Similar Symptoms Previously: No  Recently seen: No      Patient Care Team  Primary Care Provider: Carolynn Yanez    Past Medical History:     No Known Allergies  Past Medical History:   Diagnosis Date   • Diabetes mellitus (HCC)    • GERD (gastroesophageal reflux disease)    • Hypertension      History reviewed. No pertinent surgical history.  Family History   Problem Relation Age of Onset   • Colon cancer Neg Hx        Home Medications:  Prior to Admission medications    Medication Sig Start Date End Date Taking? Authorizing Provider   colestipol (COLESTID) 1 g tablet  8/23/22   Princess Joaquin MD   dicyclomine (BENTYL) 20 MG tablet TAKE 1 TABLET BY MOUTH EVERY 6 (SIX) HOURS AS NEEDED (ABDOMINAL PAIN AND DIARRHEA). 11/23/22   Christi Marks APRN   Invokana 300 MG tablet tablet  9/17/22   Princess Joaquin MD   levothyroxine (SYNTHROID, LEVOTHROID) 50 MCG tablet TAKE 1 TABLET BY MOUTH EVERY DAY FOR 90 DAYS  7/20/22   Provider, MD Princess   lisinopril (PRINIVIL,ZESTRIL) 20 MG tablet TAKE 1 TABLET BY MOUTH EVERY DAY FOR 90 DAYS 7/26/22   Princess Joaquin MD   ondansetron ODT (ZOFRAN-ODT) 4 MG disintegrating tablet Place 1 tablet on the tongue 4 (Four) Times a Day As Needed for Nausea or Vomiting. 7/24/22   Noé Capone PA-C   Pancrelipase, Lip-Prot-Amyl, (Creon) 92114-602618 units capsule delayed-release particles capsule Take 1 capsule by mouth 4 (Four) Times a Day for 90 days. Take 2 with first bite of food.  1 with snacks 10/10/22 1/8/23  Christi Marks APRN   pantoprazole (PROTONIX) 40 MG EC tablet Take 1 tablet by mouth Daily. 9/21/22   Christi Marks APRN   Sodium Sulfate-Mag Sulfate-KCl (Sutab) 7360-743-907 MG tablet Take 12 tablets by mouth Take As Directed. Take 12 tablets at 6 pm and 12 tablets 4 hours prior to procedure. 9/21/22   Christi Marks APRN   Trulicity 4.5 MG/0.5ML solution pen-injector  8/24/22   Provider, MD Princess        Social History:   PT  reports that she has never smoked. She has never used smokeless tobacco. She reports that she does not drink alcohol and does not use drugs.    Record Review:  I have reviewed the patient's records in [facility].     Review of Systems  Review of Systems   Constitutional: Negative for chills, diaphoresis, fatigue and fever.   HENT: Positive for rhinorrhea. Negative for ear pain and sore throat.    Eyes: Negative for discharge and visual disturbance.   Respiratory: Positive for cough, shortness of breath ( At times) and wheezing ( At times).    Cardiovascular: Positive for chest pain ( chest sore with cough).   Gastrointestinal: Negative for abdominal pain, diarrhea and vomiting.   Genitourinary: Negative for difficulty urinating.   Musculoskeletal: Negative for arthralgias, back pain and myalgias.   Skin: Negative for rash.   Neurological: Negative for light-headedness and headaches.   Hematological: Negative for adenopathy.  "  Psychiatric/Behavioral: Negative.         Physical Exam  /79 (BP Location: Left arm, Patient Position: Sitting)   Pulse 109   Temp 98.6 °F (37 °C) (Oral)   Resp 20   Ht 157.5 cm (62\")   Wt 64.8 kg (142 lb 13.7 oz)   SpO2 100%   BMI 26.13 kg/m²     Physical Exam  Vitals and nursing note reviewed.   Constitutional:       General: She is not in acute distress.     Appearance: Normal appearance. She is not toxic-appearing.   HENT:      Head: Normocephalic and atraumatic.      Right Ear: Tympanic membrane, ear canal and external ear normal.      Left Ear: Tympanic membrane, ear canal and external ear normal.      Nose: Nose normal.      Mouth/Throat:      Mouth: Mucous membranes are moist.   Eyes:      Conjunctiva/sclera: Conjunctivae normal.   Cardiovascular:      Rate and Rhythm: Regular rhythm. Tachycardia present.      Pulses: Normal pulses.      Heart sounds: Normal heart sounds.   Pulmonary:      Effort: Pulmonary effort is normal.      Breath sounds: Normal breath sounds.      Comments: Dry hacking cough.  Bronchospasm noted with deep breath  Chest:      Chest wall: Tenderness ( Diffuse anterior chest wall tenderness) present.   Abdominal:      General: Bowel sounds are normal.      Palpations: Abdomen is soft.      Tenderness: There is no abdominal tenderness. There is no right CVA tenderness or left CVA tenderness.   Musculoskeletal:         General: Normal range of motion.      Cervical back: Normal range of motion.   Skin:     General: Skin is warm and dry.   Neurological:      General: No focal deficit present.      Mental Status: She is alert and oriented to person, place, and time.   Psychiatric:         Mood and Affect: Mood normal.         Behavior: Behavior normal.         Thought Content: Thought content normal.         Judgment: Judgment normal.                  ED Course  /79 (BP Location: Left arm, Patient Position: Sitting)   Pulse 109   Temp 98.6 °F (37 °C) (Oral)   Resp 20  " " Ht 157.5 cm (62\")   Wt 64.8 kg (142 lb 13.7 oz)   SpO2 100%   BMI 26.13 kg/m²   Results for orders placed or performed during the hospital encounter of 12/14/22   Influenza Antigen, Rapid - Swab, Nasopharynx    Specimen: Nasopharynx; Swab   Result Value Ref Range    Influenza A Ag, EIA Negative Negative    Influenza B Ag, EIA Negative Negative   COVID-19,APTIMA PANTHER(LEXIE), PAUL/ LELA, NP/OP SWAB IN UTM/VTM/[facility] TRANSPORT MEDIA,24 HR TAT - Swab, Nasopharynx    Specimen: Nasopharynx; Swab   Result Value Ref Range    COVID19 Not Detected Not Detected - Ref. Range   Comprehensive Metabolic Panel    Specimen: Blood   Result Value Ref Range    Glucose 260 (H) 65 - 99 mg/dL    BUN 15 6 - 20 mg/dL    Creatinine 0.83 0.57 - 1.00 mg/dL    Sodium 143 136 - 145 mmol/L    Potassium 4.5 3.5 - 5.2 mmol/L    Chloride 105 98 - 107 mmol/L    CO2 27.8 22.0 - 29.0 mmol/L    Calcium 10.4 8.6 - 10.5 mg/dL    Total Protein 7.3 6.0 - 8.5 g/dL    Albumin 3.90 3.50 - 5.20 g/dL    ALT (SGPT) 32 1 - 33 U/L    AST (SGOT) 13 1 - 32 U/L    Alkaline Phosphatase 180 (H) 39 - 117 U/L    Total Bilirubin 0.3 0.0 - 1.2 mg/dL    Globulin 3.4 gm/dL    A/G Ratio 1.1 g/dL    BUN/Creatinine Ratio 18.1 7.0 - 25.0    Anion Gap 10.2 5.0 - 15.0 mmol/L    eGFR 82.3 >60.0 mL/min/1.73   BNP    Specimen: Blood   Result Value Ref Range    proBNP <36.0 0.0 - 900.0 pg/mL   Troponin    Specimen: Blood   Result Value Ref Range    Troponin T <0.010 0.000 - 0.030 ng/mL   CBC Auto Differential    Specimen: Blood   Result Value Ref Range    WBC 10.11 3.40 - 10.80 10*3/mm3    RBC 4.79 3.77 - 5.28 10*6/mm3    Hemoglobin 14.1 12.0 - 15.9 g/dL    Hematocrit 43.3 34.0 - 46.6 %    MCV 90.4 79.0 - 97.0 fL    MCH 29.4 26.6 - 33.0 pg    MCHC 32.6 31.5 - 35.7 g/dL    RDW 12.8 12.3 - 15.4 %    RDW-SD 41.9 37.0 - 54.0 fl    MPV 11.4 6.0 - 12.0 fL    Platelets 324 140 - 450 10*3/mm3    Neutrophil % 64.5 42.7 - 76.0 %    Lymphocyte % 25.9 19.6 - 45.3 %    Monocyte % 7.8 5.0 - " 12.0 %    Eosinophil % 1.1 0.3 - 6.2 %    Basophil % 0.3 0.0 - 1.5 %    Immature Grans % 0.4 0.0 - 0.5 %    Neutrophils, Absolute 6.52 1.70 - 7.00 10*3/mm3    Lymphocytes, Absolute 2.62 0.70 - 3.10 10*3/mm3    Monocytes, Absolute 0.79 0.10 - 0.90 10*3/mm3    Eosinophils, Absolute 0.11 0.00 - 0.40 10*3/mm3    Basophils, Absolute 0.03 0.00 - 0.20 10*3/mm3    Immature Grans, Absolute 0.04 0.00 - 0.05 10*3/mm3    nRBC 0.0 0.0 - 0.2 /100 WBC   ECG 12 Lead ED Triage Standing Order; SOA   Result Value Ref Range    QT Interval 319 ms   Green Top (Gel)   Result Value Ref Range    Extra Tube Hold for add-ons.    Lavender Top   Result Value Ref Range    Extra Tube hold for add-on    Gold Top - SST   Result Value Ref Range    Extra Tube Hold for add-ons.    Light Blue Top   Result Value Ref Range    Extra Tube Hold for add-ons.      Medications   sodium chloride 0.9 % flush 10 mL (has no administration in time range)   predniSONE (DELTASONE) tablet 60 mg (60 mg Oral Given 12/14/22 0530)     XR Chest 1 View    Result Date: 12/14/2022  Narrative: PROCEDURE: XR CHEST 1 VW  COMPARISON: None.  INDICATIONS: SHORT OF BREATH; COUGH.  FINDINGS: A single frontal (AP or PA upright portable) chest radiograph reveals no cardiac enlargement and no acute infiltrate. No pneumothorax is seen.  Chronic calcified granulomatous disease may involve the chest.  Patient has undergone cholecystectomy.  Mild biapical pleural-parenchymal scarring is possible.      Impression:  No acute cardiopulmonary disease is appreciated radiographically.       Please note that portions of this note were completed with a voice recognition program.  ELLIE GIBBS JR, MD       Electronically Signed and Approved By: ELLIE GIBBS JR, MD on 12/14/2022 at 2:56                Procedures/EKGs:  Procedures    EKG:  Narrative & Impression    HEART RATE= 92  bpm  RR Interval= 652  ms  VA Interval= 151  ms  P Horizontal Axis= 1  deg  P Front Axis= 44  deg  QRSD Interval= 66   ms  QT Interval= 319  ms  QRS Axis= 3  deg  T Wave Axis= 42  deg  - OTHERWISE NORMAL ECG -  Sinus rhythm         Medical Decision Making:                     MDM  Number of Diagnoses or Management Options  Bronchitis  Diagnosis management comments: I have explained the patient´s condition, diagnoses and treatment plan based on the information available to me at this time. I have answered questions and addressed any concerns. The patient has a good  understanding of the patient´s diagnosis, condition, and treatment plan as can be expected at this point. The vital signs have been stable. The patient´s condition is stable and appropriate for discharge from the emergency department.      The patient will pursue further outpatient evaluation with the primary care physician or other designated or consulting physician as outlined in the discharge instructions. They are agreeable to this plan of care and follow-up instructions have been explained in detail. The patient has received these instructions in written format and have expressed an understanding of the discharge instructions. The patient is aware that any significant change in condition or worsening of symptoms should prompt an immediate return to this or the closest emergency department or call to 911.         Amount and/or Complexity of Data Reviewed  Clinical lab tests: reviewed and ordered  Tests in the radiology section of CPT®: reviewed and ordered  Tests in the medicine section of CPT®: reviewed and ordered    Risk of Complications, Morbidity, and/or Mortality  Presenting problems: low  Diagnostic procedures: low  Management options: low    Patient Progress  Patient progress: stable       Final diagnoses:   Bronchitis        Disposition:  ED Disposition     ED Disposition   Discharge    Condition   Stable    Comment   --              Brianna Francisco, APRN  12/14/22 0625

## 2023-01-19 ENCOUNTER — TELEPHONE (OUTPATIENT)
Dept: GASTROENTEROLOGY | Facility: CLINIC | Age: 58
End: 2023-01-19
Payer: MEDICAID

## 2023-01-30 ENCOUNTER — TELEPHONE (OUTPATIENT)
Dept: GASTROENTEROLOGY | Facility: CLINIC | Age: 58
End: 2023-01-30
Payer: MEDICAID

## 2023-04-14 ENCOUNTER — PREP FOR SURGERY (OUTPATIENT)
Dept: OTHER | Facility: HOSPITAL | Age: 58
End: 2023-04-14
Payer: MEDICAID

## 2023-05-02 ENCOUNTER — HOSPITAL ENCOUNTER (EMERGENCY)
Facility: HOSPITAL | Age: 58
Discharge: HOME | End: 2023-05-03
Attending: EMERGENCY MEDICINE
Payer: MEDICAID

## 2023-05-02 ENCOUNTER — APPOINTMENT (OUTPATIENT)
Dept: CT IMAGING | Facility: HOSPITAL | Age: 58
End: 2023-05-02
Payer: MEDICAID

## 2023-05-02 DIAGNOSIS — R19.7 DIARRHEA, UNSPECIFIED TYPE: Primary | ICD-10-CM

## 2023-05-02 LAB
ALBUMIN SERPL-MCNC: 4.2 G/DL (ref 3.5–5.2)
ALBUMIN/GLOB SERPL: 1.4 G/DL
ALP SERPL-CCNC: 169 U/L (ref 39–117)
ALT SERPL W P-5'-P-CCNC: 82 U/L (ref 1–33)
ANION GAP SERPL CALCULATED.3IONS-SCNC: 10 MMOL/L (ref 5–15)
AST SERPL-CCNC: 99 U/L (ref 1–32)
BASOPHILS # BLD AUTO: 0.02 10*3/MM3 (ref 0–0.2)
BASOPHILS NFR BLD AUTO: 0.2 % (ref 0–1.5)
BILIRUB SERPL-MCNC: 0.5 MG/DL (ref 0–1.2)
BILIRUB UR QL STRIP: NEGATIVE
BUN SERPL-MCNC: 16 MG/DL (ref 6–20)
BUN/CREAT SERPL: 18.8 (ref 7–25)
CALCIUM SPEC-SCNC: 9.8 MG/DL (ref 8.6–10.5)
CHLORIDE SERPL-SCNC: 102 MMOL/L (ref 98–107)
CLARITY UR: CLEAR
CO2 SERPL-SCNC: 27 MMOL/L (ref 22–29)
COLOR UR: YELLOW
CREAT SERPL-MCNC: 0.85 MG/DL (ref 0.57–1)
D-LACTATE SERPL-SCNC: 2.1 MMOL/L (ref 0.5–2)
D-LACTATE SERPL-SCNC: 2.6 MMOL/L (ref 0.5–2)
DEPRECATED RDW RBC AUTO: 41.4 FL (ref 37–54)
EGFRCR SERPLBLD CKD-EPI 2021: 80 ML/MIN/1.73
EOSINOPHIL # BLD AUTO: 0.06 10*3/MM3 (ref 0–0.4)
EOSINOPHIL NFR BLD AUTO: 0.5 % (ref 0.3–6.2)
ERYTHROCYTE [DISTWIDTH] IN BLOOD BY AUTOMATED COUNT: 12.7 % (ref 12.3–15.4)
GLOBULIN UR ELPH-MCNC: 3.1 GM/DL
GLUCOSE SERPL-MCNC: 170 MG/DL (ref 65–99)
GLUCOSE UR STRIP-MCNC: ABNORMAL MG/DL
HCT VFR BLD AUTO: 51.4 % (ref 34–46.6)
HGB BLD-MCNC: 17.2 G/DL (ref 12–15.9)
HGB UR QL STRIP.AUTO: NEGATIVE
HOLD SPECIMEN: NORMAL
HOLD SPECIMEN: NORMAL
IMM GRANULOCYTES # BLD AUTO: 0.04 10*3/MM3 (ref 0–0.05)
IMM GRANULOCYTES NFR BLD AUTO: 0.3 % (ref 0–0.5)
KETONES UR QL STRIP: NEGATIVE
LEUKOCYTE ESTERASE UR QL STRIP.AUTO: NEGATIVE
LIPASE SERPL-CCNC: 222 U/L (ref 13–60)
LYMPHOCYTES # BLD AUTO: 0.9 10*3/MM3 (ref 0.7–3.1)
LYMPHOCYTES NFR BLD AUTO: 7.7 % (ref 19.6–45.3)
MCH RBC QN AUTO: 30.2 PG (ref 26.6–33)
MCHC RBC AUTO-ENTMCNC: 33.5 G/DL (ref 31.5–35.7)
MCV RBC AUTO: 90.2 FL (ref 79–97)
MONOCYTES # BLD AUTO: 0.56 10*3/MM3 (ref 0.1–0.9)
MONOCYTES NFR BLD AUTO: 4.8 % (ref 5–12)
NEUTROPHILS NFR BLD AUTO: 10.13 10*3/MM3 (ref 1.7–7)
NEUTROPHILS NFR BLD AUTO: 86.5 % (ref 42.7–76)
NITRITE UR QL STRIP: NEGATIVE
NRBC BLD AUTO-RTO: 0 /100 WBC (ref 0–0.2)
PH UR STRIP.AUTO: <=5 [PH] (ref 5–8)
PLATELET # BLD AUTO: 347 10*3/MM3 (ref 140–450)
PMV BLD AUTO: 9.9 FL (ref 6–12)
POTASSIUM SERPL-SCNC: 4.2 MMOL/L (ref 3.5–5.2)
PROT SERPL-MCNC: 7.3 G/DL (ref 6–8.5)
PROT UR QL STRIP: NEGATIVE
RBC # BLD AUTO: 5.7 10*6/MM3 (ref 3.77–5.28)
SODIUM SERPL-SCNC: 139 MMOL/L (ref 136–145)
SP GR UR STRIP: >1.03 (ref 1–1.03)
UROBILINOGEN UR QL STRIP: ABNORMAL
WBC NRBC COR # BLD: 11.71 10*3/MM3 (ref 3.4–10.8)
WHOLE BLOOD HOLD COAG: NORMAL
WHOLE BLOOD HOLD SPECIMEN: NORMAL

## 2023-05-02 PROCEDURE — 83605 ASSAY OF LACTIC ACID: CPT | Performed by: EMERGENCY MEDICINE

## 2023-05-02 PROCEDURE — 81003 URINALYSIS AUTO W/O SCOPE: CPT

## 2023-05-02 PROCEDURE — 36415 COLL VENOUS BLD VENIPUNCTURE: CPT

## 2023-05-02 PROCEDURE — 99283 EMERGENCY DEPT VISIT LOW MDM: CPT

## 2023-05-02 PROCEDURE — 83605 ASSAY OF LACTIC ACID: CPT

## 2023-05-02 PROCEDURE — 74177 CT ABD & PELVIS W/CONTRAST: CPT

## 2023-05-02 PROCEDURE — 25010000002 ONDANSETRON PER 1 MG: Performed by: EMERGENCY MEDICINE

## 2023-05-02 PROCEDURE — 25510000001 IOPAMIDOL PER 1 ML: Performed by: EMERGENCY MEDICINE

## 2023-05-02 PROCEDURE — 83690 ASSAY OF LIPASE: CPT

## 2023-05-02 PROCEDURE — 85025 COMPLETE CBC W/AUTO DIFF WBC: CPT

## 2023-05-02 PROCEDURE — 80053 COMPREHEN METABOLIC PANEL: CPT

## 2023-05-02 PROCEDURE — 96374 THER/PROPH/DIAG INJ IV PUSH: CPT

## 2023-05-02 RX ORDER — SODIUM CHLORIDE 0.9 % (FLUSH) 0.9 %
10 SYRINGE (ML) INJECTION AS NEEDED
Status: DISCONTINUED | OUTPATIENT
Start: 2023-05-02 | End: 2023-05-03 | Stop reason: HOSPADM

## 2023-05-02 RX ORDER — ONDANSETRON 2 MG/ML
4 INJECTION INTRAMUSCULAR; INTRAVENOUS ONCE
Status: COMPLETED | OUTPATIENT
Start: 2023-05-02 | End: 2023-05-02

## 2023-05-02 RX ADMIN — SODIUM CHLORIDE 1000 ML: 9 INJECTION, SOLUTION INTRAVENOUS at 22:55

## 2023-05-02 RX ADMIN — ONDANSETRON 4 MG: 2 INJECTION INTRAMUSCULAR; INTRAVENOUS at 22:55

## 2023-05-02 RX ADMIN — IOPAMIDOL 100 ML: 755 INJECTION, SOLUTION INTRAVENOUS at 23:44

## 2023-05-02 NOTE — ED PROVIDER NOTES
See procedure note   Time: 7:00 PM EDT  Date of encounter:  5/2/2023  Independent Historian/Clinical History and Information was obtained by:   Patient  Chief Complaint   Patient presents with   • Abdominal Pain   • Vomiting   • Diarrhea       History is limited by: N/A    History of Present Illness:  Patient is a 57 y.o. year old female who presents to the emergency department for evaluation of nausea, vomiting, diarrhea, and left sided abdominal pain since 4 AM today.  She denies fever.  She has taken an antidiarrheal medicine.  Patient denies cough or hemoptysis.  Patient has no dysuria urinary frequency.  She states that she has had several episodes of diarrhea.    HPI    Patient Care Team  Primary Care Provider: Carolynn Yanez APRN    Past Medical History:     No Known Allergies  Past Medical History:   Diagnosis Date   • Diabetes mellitus    • GERD (gastroesophageal reflux disease)    • Hypertension      History reviewed. No pertinent surgical history.  Family History   Problem Relation Age of Onset   • Colon cancer Neg Hx        Home Medications:  Prior to Admission medications    Medication Sig Start Date End Date Taking? Authorizing Provider   albuterol sulfate  (90 Base) MCG/ACT inhaler Inhale 2 puffs Every 6 (Six) Hours As Needed for Shortness of Air or Wheezing. 12/14/22   Brianna Francisco APRN   azithromycin (Zithromax Z-Jamir) 250 MG tablet Take 2 tablets by mouth on day 1, then 1 tablet daily on days 2-5 12/14/22   Brianna Francisco APRN   benzonatate (TESSALON) 200 MG capsule Take 1 capsule by mouth 3 (Three) Times a Day As Needed for Cough. 12/14/22   Brianna Francisco APRN   colestipol (COLESTID) 1 g tablet  8/23/22   Princess Joaquin MD   dicyclomine (BENTYL) 20 MG tablet TAKE 1 TABLET BY MOUTH EVERY 6 (SIX) HOURS AS NEEDED (ABDOMINAL PAIN AND DIARRHEA). 11/23/22   Christi Marks APRN   Invokana 300 MG tablet tablet  9/17/22   Princess Joaquin MD   levothyroxine (SYNTHROID, LEVOTHROID) 50 MCG tablet TAKE 1  "TABLET BY MOUTH EVERY DAY FOR 90 DAYS 7/20/22   Provider, MD Princess   lisinopril (PRINIVIL,ZESTRIL) 20 MG tablet TAKE 1 TABLET BY MOUTH EVERY DAY FOR 90 DAYS 7/26/22   ProviderPrincess MD   ondansetron ODT (ZOFRAN-ODT) 4 MG disintegrating tablet Place 1 tablet on the tongue 4 (Four) Times a Day As Needed for Nausea or Vomiting. 7/24/22   Noé Capone PA-C   pantoprazole (PROTONIX) 40 MG EC tablet Take 1 tablet by mouth Daily. 9/21/22   Christi Marks APRN   Sodium Sulfate-Mag Sulfate-KCl (Sutab) 9489-693-646 MG tablet Take 12 tablets by mouth Take As Directed. Take 12 tablets at 6 pm and 12 tablets 4 hours prior to procedure. 9/21/22   Christi Marks APRN   Trulicity 4.5 MG/0.5ML solution pen-injector  8/24/22   ProviderPrincess MD        Social History:   Social History     Tobacco Use   • Smoking status: Never   • Smokeless tobacco: Never   Vaping Use   • Vaping Use: Never used   Substance Use Topics   • Alcohol use: Never   • Drug use: Never         Review of Systems:  Review of Systems   Constitutional: Negative for chills and fever.   HENT: Negative for congestion, rhinorrhea and sore throat.    Eyes: Negative for pain and visual disturbance.   Respiratory: Negative for apnea, cough, chest tightness and shortness of breath.    Cardiovascular: Negative for chest pain and palpitations.   Gastrointestinal: Positive for abdominal pain, diarrhea, nausea and vomiting.   Genitourinary: Negative for difficulty urinating and dysuria.   Musculoskeletal: Negative for joint swelling and myalgias.   Skin: Negative for color change.   Neurological: Negative for seizures and headaches.   Psychiatric/Behavioral: Negative.    All other systems reviewed and are negative.       Physical Exam:  /81 (BP Location: Right arm, Patient Position: Sitting)   Pulse 84   Temp 98 °F (36.7 °C) (Oral)   Resp 18   Ht 165.1 cm (65\")   Wt 63.4 kg (139 lb 12.4 oz)   SpO2 99%   BMI 23.26 kg/m² "     Physical Exam  Vitals and nursing note reviewed.   Constitutional:       General: She is not in acute distress.     Appearance: Normal appearance. She is well-developed. She is not toxic-appearing.   HENT:      Head: Normocephalic and atraumatic.      Jaw: There is normal jaw occlusion.   Eyes:      General: Lids are normal.      Extraocular Movements: Extraocular movements intact.      Conjunctiva/sclera: Conjunctivae normal.      Pupils: Pupils are equal, round, and reactive to light.   Cardiovascular:      Rate and Rhythm: Normal rate and regular rhythm.      Pulses: Normal pulses.      Heart sounds: Normal heart sounds.   Pulmonary:      Effort: Pulmonary effort is normal. No respiratory distress.      Breath sounds: Normal breath sounds. No wheezing or rhonchi.   Abdominal:      General: Abdomen is flat.      Palpations: Abdomen is soft.      Tenderness: There is abdominal tenderness in the left lower quadrant. There is no guarding or rebound.   Musculoskeletal:         General: Normal range of motion.      Cervical back: Normal range of motion and neck supple.      Right lower leg: No edema.      Left lower leg: No edema.   Skin:     General: Skin is warm and dry.   Neurological:      Mental Status: She is alert and oriented to person, place, and time. Mental status is at baseline.   Psychiatric:         Mood and Affect: Mood normal.                  Procedures:  Procedures      Medical Decision Making:      Comorbidities that affect care:    Hypertension    External Notes reviewed:    Previous Clinic Note: Patient was seen by Christi Hastings for evaluation of diarrhea has been intermittent for the past 20 years      The following orders were placed and all results were independently analyzed by me:  Orders Placed This Encounter   Procedures   • CT Abdomen Pelvis With Contrast   • [facility] Draw   • Comprehensive Metabolic Panel   • Lipase   • Urinalysis With Microscopic If Indicated (No Culture) - Urine,  Clean Catch   • Lactic Acid, Plasma   • CBC Auto Differential   • STAT Lactic Acid, Reflex   • STAT Lactic Acid, Reflex   • NPO Diet NPO Type: Strict NPO   • Undress & Gown   • Insert Peripheral IV   • CBC & Differential   • Green Top (Gel)   • Lavender Top   • Gold Top - SST   • Light Blue Top       Medications Given in the Emergency Department:  Medications   sodium chloride 0.9 % flush 10 mL (has no administration in time range)   sodium chloride 0.9 % bolus 1,000 mL (0 mL Intravenous Stopped 5/3/23 0104)   ondansetron (ZOFRAN) injection 4 mg (4 mg Intravenous Given 5/2/23 2255)   iopamidol (ISOVUE-370) 76 % injection 100 mL (100 mL Intravenous Given 5/2/23 2344)        ED Course:    The patient was initially evaluated in the triage area where orders were placed. The patient was later dispositioned by Danyell Springer MD.      The patient was advised to stay for completion of workup which includes but is not limited to communication of labs and radiological results, reassessment and plan. The patient was advised that leaving prior to disposition by a provider could result in critical findings that are not communicated to the patient.          Labs:    Lab Results (last 24 hours)     Procedure Component Value Units Date/Time    CBC & Differential [[id number]]  (Abnormal) Collected: 05/02/23 1852    Specimen: Blood Updated: 05/02/23 1907    Narrative:      The following orders were created for panel order CBC & Differential.  Procedure                               Abnormality         Status                     ---------                               -----------         ------                     CBC Auto Differential[[id number]]        Abnormal            Final result                 Please view results for these tests on the individual orders.    Comprehensive Metabolic Panel [[id number]]  (Abnormal) Collected: 05/02/23 1852    Specimen: Blood Updated: 05/02/23 1943     Glucose 170 mg/dL      BUN 16 mg/dL       Creatinine 0.85 mg/dL      Sodium 139 mmol/L      Potassium 4.2 mmol/L      Chloride 102 mmol/L      CO2 27.0 mmol/L      Calcium 9.8 mg/dL      Total Protein 7.3 g/dL      Albumin 4.2 g/dL      ALT (SGPT) 82 U/L      AST (SGOT) 99 U/L      Alkaline Phosphatase 169 U/L      Total Bilirubin 0.5 mg/dL      Globulin 3.1 gm/dL      A/G Ratio 1.4 g/dL      BUN/Creatinine Ratio 18.8     Anion Gap 10.0 mmol/L      eGFR 80.0 mL/min/1.73     Narrative:      GFR Normal >60  Chronic Kidney Disease <60  Kidney Failure <15      Lipase [[id number]]  (Abnormal) Collected: 05/02/23 1852    Specimen: Blood Updated: 05/02/23 1943     Lipase 222 U/L     Lactic Acid, Plasma [[id number]]  (Abnormal) Collected: 05/02/23 1852    Specimen: Blood Updated: 05/02/23 1945     Lactate 2.1 mmol/L     CBC Auto Differential [[id number]]  (Abnormal) Collected: 05/02/23 1852    Specimen: Blood Updated: 05/02/23 1907     WBC 11.71 10*3/mm3      RBC 5.70 10*6/mm3      Hemoglobin 17.2 g/dL      Hematocrit 51.4 %      MCV 90.2 fL      MCH 30.2 pg      MCHC 33.5 g/dL      RDW 12.7 %      RDW-SD 41.4 fl      MPV 9.9 fL      Platelets 347 10*3/mm3      Neutrophil % 86.5 %      Lymphocyte % 7.7 %      Monocyte % 4.8 %      Eosinophil % 0.5 %      Basophil % 0.2 %      Immature Grans % 0.3 %      Neutrophils, Absolute 10.13 10*3/mm3      Lymphocytes, Absolute 0.90 10*3/mm3      Monocytes, Absolute 0.56 10*3/mm3      Eosinophils, Absolute 0.06 10*3/mm3      Basophils, Absolute 0.02 10*3/mm3      Immature Grans, Absolute 0.04 10*3/mm3      nRBC 0.0 /100 WBC     Urinalysis With Microscopic If Indicated (No Culture) - Urine, Clean Catch [[id number]]  (Abnormal) Collected: 05/02/23 1858    Specimen: Urine, Clean Catch Updated: 05/02/23 1915     Color, UA Yellow     Appearance, UA Clear     pH, UA <=5.0     Specific Gravity, UA >1.030     Glucose, UA >=1000 mg/dL (3+)     Ketones, UA Negative     Bilirubin, UA Negative     Blood, UA Negative     Protein, UA Negative      Leuk Esterase, UA Negative     Nitrite, UA Negative     Urobilinogen, UA 0.2 E.U./dL    Narrative:      Urine microscopic not indicated.    STAT Lactic Acid, Reflex [[id number]]  (Abnormal) Collected: 05/02/23 2220    Specimen: Blood Updated: 05/02/23 2310     Lactate 2.6 mmol/L            Imaging:    CT Abdomen Pelvis With Contrast    Result Date: 5/3/2023  PROCEDURE: CT ABDOMEN PELVIS W CONTRAST  COMPARISON: 7/24/2022.  INDICATIONS: LEFT LOWER QUADRANT ABDOMINAL PAIN; NAUSEA & VOMITING.  TECHNIQUE: After obtaining the patient's consent, CT images were created with non-ionic intravenous contrast material.  Please note that the study was not tailored in order to evaluate an obstructing ureteral calculus.  PROTOCOL:   Standard CT imaging protocol performed.    RADIATION:   Total DLP: 240 mGy*cm.   Automated exposure control was utilized to minimize radiation dose. CONTRAST: 100 mL Isovue 370 I.V.  FINDINGS: No acute findings are seen.  No significant interval change is appreciated since the prior CT study.  Nonobstructing left nephrolithiasis is noted.  Renal cortical scarring is seen.  No definite right nephrolithiasis.  No hydronephrosis or ureterolithiasis.  There are probably stable right-sided renal cysts.  There is mild distension of the urinary bladder.  No urinary bladder calculi are seen.  There are scattered colonic diverticula without acute diverticulitis.  The appendix is not clearly identified.  Please correlate with the surgical history.  No mechanical bowel obstruction.  No pneumoperitoneum.  No definite suspicious uterine or adnexal mass.  There is a small umbilical hernia.  It contains fat and no bowel.  It is similar to the prior study.  No acute infiltrate is suspected.  There is chronic calcified granulomatous disease of the chest.  There is diffuse hepatic steatosis without hepatomegaly most likely.  No splenomegaly.  Degenerative changes involve the imaged spine and the bilateral sacroiliac  joints.  There is suspected pubic osteitis.  The patient has undergone cholecystectomy.  No acute pancreatitis.       No acute findings are seen.  Please see above comments for further detail.     Please note that portions of this note were completed with a voice recognition program.  ELLIE GIBBS JR, MD       Electronically Signed and Approved By: ELLIE GIBBS JR, MD on 5/03/2023 at 1:12                  Differential Diagnosis and Discussion:      Diarrhea: Differential diagnosis includes but is not limited to malabsorption syndrome, bacterial infection, carcinoid syndrome, pancreatic hypersecretion, viral infection, celiac sprue, Crohn's disease, ulcerative colitis, ischemic colitis, colitis, hypermotility, and irritable bowel syndrome.    All labs were reviewed and interpreted by me.  CT scan radiology impression was interpreted by me.     The patient´s CBC that was reviewed and interpreted by me shows no abnormalities of critical concern. Of note, there is no anemia requiring a blood transfusion and the platelet count is acceptable.  The patient´s CMP that was reviewed and interpretted by me shows no abnormalities of critical concern. Of note, the patient´s sodium and potassium are acceptable. The patient´s liver enzymes are unremarkable. The patient´s renal function (creatinine) is preserved. The patient has a normal anion gap.  Lipase is 222.  Urinalysis is negative for ketonuria.  CT scan abdomen pelvis is negative for acute intra-abdominal pathology.    MDM     Decision making regarding discharge:    This is a 57-year-old female with a history of chronic diarrhea.    Diarrhea is a common gastrointestinal symptom with various potential causes. The differential diagnosis for diarrhea includes:    Infectious causes: Bacterial, viral, or parasitic infections, such as Escherichia coli, Salmonella, Shigella, Campylobacter, rotavirus, norovirus, or Giardia, can cause diarrhea.    Inflammatory bowel disease  (IBD): Crohn's disease and ulcerative colitis are chronic inflammatory conditions that can cause persistent or intermittent diarrhea.    Irritable bowel syndrome (IBS): IBS is a functional gastrointestinal disorder characterized by abdominal pain and altered bowel habits, including diarrhea.    Food intolerances or allergies: Lactose intolerance, gluten intolerance (celiac disease), and other food sensitivities can cause diarrhea after the ingestion of specific triggers.    Medication side effects: Diarrhea can be a side effect of various medications, such as antibiotics, nonsteroidal anti-inflammatory drugs (NSAIDs), and laxatives.    Malabsorption syndromes: Conditions that impair the absorption of nutrients in the gastrointestinal tract, such as pancreatic insufficiency or bile acid malabsorption, can lead to diarrhea.    Gastrointestinal motility disorders: Abnormalities in gut motility, such as diabetic neuropathy or post-surgical complications, can cause diarrhea.    Gastroenteritis: Inflammation of the stomach and intestines, often caused by a viral or bacterial infection, can result in diarrhea.    A normal CT scan of the abdomen and pelvis can help rule out several entities in the differential diagnosis, including:    Obstructive or mechanical causes of diarrhea, such as bowel obstruction or intussusception.  Inflammatory bowel disease (IBD), as the CT scan may show thickening or inflammation of the bowel walls in active cases.  Intra-abdominal abscesses or masses that could contribute to diarrhea.     The patient has a soft and benign abdominal exam. The patient is now resting comfortably and feels better, is alert, and is in no distress. The patient is able to tolerate po intake in the ED. The patient´s labs were reviewed and hydration status was assessed. The patient has no signs of acute renal failure, sepsis, or dehydration that warrants admission to the hospital. The vital signs have been stable. The  patient's condition is stable and appropriate for discharge. The patient will pursue further outpatient evaluation with the primary care physician or designated physician. The patient and/or caregivers have expressed a clear and thorough understanding and agreed to follow-up as instructed.          Patient Care Considerations:    ANTIBIOTICS: I considered prescribing antibiotics as an outpatient however Patient has a normal white blood cell count.      Consultants/Shared Management Plan:    None    Social Determinants of Health:    Patient is independent, reliable, and has access to care.       Disposition and Care Coordination:    Discharged: I considered escalation of care by admitting this patient for observation, however the patient has improved and is suitable and  stable for discharge.    I have explained the patient´s condition, diagnoses and treatment plan based on the information available to me at this time. I have answered questions and addressed any concerns. The patient has a good  understanding of the patient´s diagnosis, condition, and treatment plan as can be expected at this point. The vital signs have been stable. The patient´s condition is stable and appropriate for discharge from the emergency department.      The patient will pursue further outpatient evaluation with the primary care physician or other designated or consulting physician as outlined in the discharge instructions. They are agreeable to this plan of care and follow-up instructions have been explained in detail. The patient has received these instructions in written format and have expressed an understanding of the discharge instructions. The patient is aware that any significant change in condition or worsening of symptoms should prompt an immediate return to this or the closest emergency department or call to 911.  I have explained discharge medications and the need for follow up with the patient/caretakers. This was also printed  in the discharge instructions. Patient was discharged with the following medications and follow up:      Medication List      Changed    dicyclomine 20 MG tablet  Commonly known as: BENTYL  Take 1 tablet by mouth Every 6 (Six) Hours.  What changed:   · when to take this  · reasons to take this     ondansetron ODT 4 MG disintegrating tablet  Commonly known as: ZOFRAN-ODT  Place 1 tablet on the tongue Every 8 (Eight) Hours As Needed for Nausea or Vomiting.  What changed: when to take this           Where to Get Your Medications      These medications were sent to [facility]/pharmacy #[id number] - [place], [place] - [address] - [phone]  - [phone]   [address] [place] [address]    Hours: 24-hours Phone: [phone]   · dicyclomine 20 MG tablet  · ondansetron ODT 4 MG disintegrating tablet      Carolynn Yanez, APRN  [address]  [address]  [place] [address]  [phone]    In 2 days         Final diagnoses:   Diarrhea, unspecified type        ED Disposition     ED Disposition   Discharge    Condition   Stable    Comment   --             This medical record created using voice recognition software.           Danyell Springer MD  05/03/23 0122

## 2023-05-03 VITALS
RESPIRATION RATE: 18 BRPM | OXYGEN SATURATION: 99 % | HEART RATE: 84 BPM | HEIGHT: 65 IN | BODY MASS INDEX: 23.29 KG/M2 | SYSTOLIC BLOOD PRESSURE: 136 MMHG | DIASTOLIC BLOOD PRESSURE: 81 MMHG | WEIGHT: 139.77 LBS | TEMPERATURE: 98 F

## 2023-05-03 RX ORDER — ONDANSETRON 4 MG/1
4 TABLET, ORALLY DISINTEGRATING ORAL EVERY 8 HOURS PRN
Qty: 15 TABLET | Refills: 0 | Status: SHIPPED | OUTPATIENT
Start: 2023-05-03

## 2023-05-03 RX ORDER — DICYCLOMINE HCL 20 MG
20 TABLET ORAL EVERY 6 HOURS
Qty: 20 TABLET | Refills: 0 | Status: SHIPPED | OUTPATIENT
Start: 2023-05-03

## 2023-05-10 ENCOUNTER — TELEPHONE (OUTPATIENT)
Dept: GASTROENTEROLOGY | Facility: CLINIC | Age: 58
End: 2023-05-10
Payer: MEDICAID

## 2023-05-10 NOTE — TELEPHONE ENCOUNTER
Called patient in regards to PAT e-mail that pt would like to cancel procedure. Left a vm requesting a return call if patient would like to r/s.   Procedure has been cancelled.

## 2024-01-01 ENCOUNTER — HOSPITAL ENCOUNTER (EMERGENCY)
Facility: HOSPITAL | Age: 59
Discharge: HOME | End: 2024-01-01
Attending: EMERGENCY MEDICINE | Admitting: EMERGENCY MEDICINE
Payer: MEDICAID

## 2024-01-01 ENCOUNTER — APPOINTMENT (OUTPATIENT)
Dept: CT IMAGING | Facility: HOSPITAL | Age: 59
End: 2024-01-01
Payer: MEDICAID

## 2024-01-01 VITALS
WEIGHT: 138.89 LBS | HEART RATE: 67 BPM | OXYGEN SATURATION: 100 % | TEMPERATURE: 98.1 F | SYSTOLIC BLOOD PRESSURE: 120 MMHG | BODY MASS INDEX: 25.56 KG/M2 | DIASTOLIC BLOOD PRESSURE: 67 MMHG | HEIGHT: 62 IN | RESPIRATION RATE: 20 BRPM

## 2024-01-01 DIAGNOSIS — R19.7 VOMITING AND DIARRHEA: Primary | ICD-10-CM

## 2024-01-01 DIAGNOSIS — R11.10 VOMITING AND DIARRHEA: Primary | ICD-10-CM

## 2024-01-01 LAB
ALBUMIN SERPL-MCNC: 4.5 G/DL (ref 3.5–5.2)
ALBUMIN/GLOB SERPL: 1.4 G/DL
ALP SERPL-CCNC: 203 U/L (ref 39–117)
ALT SERPL W P-5'-P-CCNC: 36 U/L (ref 1–33)
ANION GAP SERPL CALCULATED.3IONS-SCNC: 11.9 MMOL/L (ref 5–15)
AST SERPL-CCNC: 23 U/L (ref 1–32)
BASOPHILS # BLD AUTO: 0.03 10*3/MM3 (ref 0–0.2)
BASOPHILS NFR BLD AUTO: 0.2 % (ref 0–1.5)
BILIRUB SERPL-MCNC: 0.4 MG/DL (ref 0–1.2)
BILIRUB UR QL STRIP: NEGATIVE
BUN SERPL-MCNC: 21 MG/DL (ref 6–20)
BUN/CREAT SERPL: 23.1 (ref 7–25)
CALCIUM SPEC-SCNC: 10.1 MG/DL (ref 8.6–10.5)
CHLORIDE SERPL-SCNC: 102 MMOL/L (ref 98–107)
CLARITY UR: CLEAR
CO2 SERPL-SCNC: 26.1 MMOL/L (ref 22–29)
COLOR UR: YELLOW
CREAT SERPL-MCNC: 0.91 MG/DL (ref 0.57–1)
D-LACTATE SERPL-SCNC: 2.1 MMOL/L (ref 0.5–2)
DEPRECATED RDW RBC AUTO: 41.3 FL (ref 37–54)
EGFRCR SERPLBLD CKD-EPI 2021: 73.3 ML/MIN/1.73
EOSINOPHIL # BLD AUTO: 0.01 10*3/MM3 (ref 0–0.4)
EOSINOPHIL NFR BLD AUTO: 0.1 % (ref 0.3–6.2)
ERYTHROCYTE [DISTWIDTH] IN BLOOD BY AUTOMATED COUNT: 12.6 % (ref 12.3–15.4)
GLOBULIN UR ELPH-MCNC: 3.3 GM/DL
GLUCOSE SERPL-MCNC: 193 MG/DL (ref 65–99)
GLUCOSE UR STRIP-MCNC: ABNORMAL MG/DL
HCT VFR BLD AUTO: 50.3 % (ref 34–46.6)
HGB BLD-MCNC: 16.7 G/DL (ref 12–15.9)
HGB UR QL STRIP.AUTO: NEGATIVE
HOLD SPECIMEN: NORMAL
HOLD SPECIMEN: NORMAL
IMM GRANULOCYTES # BLD AUTO: 0.07 10*3/MM3 (ref 0–0.05)
IMM GRANULOCYTES NFR BLD AUTO: 0.4 % (ref 0–0.5)
KETONES UR QL STRIP: NEGATIVE
LEUKOCYTE ESTERASE UR QL STRIP.AUTO: NEGATIVE
LIPASE SERPL-CCNC: 30 U/L (ref 13–60)
LYMPHOCYTES # BLD AUTO: 1.55 10*3/MM3 (ref 0.7–3.1)
LYMPHOCYTES NFR BLD AUTO: 9.2 % (ref 19.6–45.3)
MAGNESIUM SERPL-MCNC: 2.1 MG/DL (ref 1.6–2.6)
MCH RBC QN AUTO: 29.5 PG (ref 26.6–33)
MCHC RBC AUTO-ENTMCNC: 33.2 G/DL (ref 31.5–35.7)
MCV RBC AUTO: 88.9 FL (ref 79–97)
MONOCYTES # BLD AUTO: 0.63 10*3/MM3 (ref 0.1–0.9)
MONOCYTES NFR BLD AUTO: 3.8 % (ref 5–12)
NEUTROPHILS NFR BLD AUTO: 14.48 10*3/MM3 (ref 1.7–7)
NEUTROPHILS NFR BLD AUTO: 86.3 % (ref 42.7–76)
NITRITE UR QL STRIP: NEGATIVE
NRBC BLD AUTO-RTO: 0 /100 WBC (ref 0–0.2)
PH UR STRIP.AUTO: <=5 [PH] (ref 5–8)
PLATELET # BLD AUTO: 387 10*3/MM3 (ref 140–450)
PMV BLD AUTO: 10.3 FL (ref 6–12)
POTASSIUM SERPL-SCNC: 4.5 MMOL/L (ref 3.5–5.2)
PROT SERPL-MCNC: 7.8 G/DL (ref 6–8.5)
PROT UR QL STRIP: NEGATIVE
RBC # BLD AUTO: 5.66 10*6/MM3 (ref 3.77–5.28)
SODIUM SERPL-SCNC: 140 MMOL/L (ref 136–145)
SP GR UR STRIP: >1.03 (ref 1–1.03)
UROBILINOGEN UR QL STRIP: ABNORMAL
WBC NRBC COR # BLD AUTO: 16.77 10*3/MM3 (ref 3.4–10.8)
WHOLE BLOOD HOLD COAG: NORMAL
WHOLE BLOOD HOLD SPECIMEN: NORMAL

## 2024-01-01 PROCEDURE — 83735 ASSAY OF MAGNESIUM: CPT | Performed by: EMERGENCY MEDICINE

## 2024-01-01 PROCEDURE — 85025 COMPLETE CBC W/AUTO DIFF WBC: CPT

## 2024-01-01 PROCEDURE — 74177 CT ABD & PELVIS W/CONTRAST: CPT

## 2024-01-01 PROCEDURE — 96375 TX/PRO/DX INJ NEW DRUG ADDON: CPT

## 2024-01-01 PROCEDURE — 81003 URINALYSIS AUTO W/O SCOPE: CPT

## 2024-01-01 PROCEDURE — 25510000001 IOPAMIDOL PER 1 ML: Performed by: EMERGENCY MEDICINE

## 2024-01-01 PROCEDURE — 96372 THER/PROPH/DIAG INJ SC/IM: CPT

## 2024-01-01 PROCEDURE — 25810000003 SODIUM CHLORIDE 0.9 % SOLUTION: Performed by: EMERGENCY MEDICINE

## 2024-01-01 PROCEDURE — 83605 ASSAY OF LACTIC ACID: CPT

## 2024-01-01 PROCEDURE — 87040 BLOOD CULTURE FOR BACTERIA: CPT

## 2024-01-01 PROCEDURE — 80053 COMPREHEN METABOLIC PANEL: CPT

## 2024-01-01 PROCEDURE — 96374 THER/PROPH/DIAG INJ IV PUSH: CPT

## 2024-01-01 PROCEDURE — 99285 EMERGENCY DEPT VISIT HI MDM: CPT

## 2024-01-01 PROCEDURE — 25010000002 ONDANSETRON PER 1 MG: Performed by: EMERGENCY MEDICINE

## 2024-01-01 PROCEDURE — 25010000002 DICYCLOMINE PER 20 MG: Performed by: EMERGENCY MEDICINE

## 2024-01-01 PROCEDURE — 83690 ASSAY OF LIPASE: CPT

## 2024-01-01 PROCEDURE — 36415 COLL VENOUS BLD VENIPUNCTURE: CPT

## 2024-01-01 RX ORDER — SODIUM CHLORIDE 0.9 % (FLUSH) 0.9 %
10 SYRINGE (ML) INJECTION AS NEEDED
Status: DISCONTINUED | OUTPATIENT
Start: 2024-01-01 | End: 2024-01-01 | Stop reason: HOSPADM

## 2024-01-01 RX ORDER — FAMOTIDINE 10 MG/ML
20 INJECTION, SOLUTION INTRAVENOUS ONCE
Status: COMPLETED | OUTPATIENT
Start: 2024-01-01 | End: 2024-01-01

## 2024-01-01 RX ORDER — DICYCLOMINE HYDROCHLORIDE 10 MG/ML
20 INJECTION INTRAMUSCULAR ONCE
Status: COMPLETED | OUTPATIENT
Start: 2024-01-01 | End: 2024-01-01

## 2024-01-01 RX ORDER — DICYCLOMINE HCL 20 MG
20 TABLET ORAL EVERY 6 HOURS
Qty: 28 TABLET | Refills: 0 | Status: SHIPPED | OUTPATIENT
Start: 2024-01-01 | End: 2024-01-08

## 2024-01-01 RX ORDER — ONDANSETRON 4 MG/1
8 TABLET, ORALLY DISINTEGRATING ORAL EVERY 8 HOURS PRN
Qty: 15 TABLET | Refills: 0 | Status: SHIPPED | OUTPATIENT
Start: 2024-01-01 | End: 2024-01-06

## 2024-01-01 RX ORDER — ONDANSETRON 2 MG/ML
4 INJECTION INTRAMUSCULAR; INTRAVENOUS ONCE
Status: COMPLETED | OUTPATIENT
Start: 2024-01-01 | End: 2024-01-01

## 2024-01-01 RX ADMIN — FAMOTIDINE 20 MG: 10 INJECTION INTRAVENOUS at 12:51

## 2024-01-01 RX ADMIN — DICYCLOMINE HYDROCHLORIDE 20 MG: 20 INJECTION, SOLUTION INTRAMUSCULAR at 12:54

## 2024-01-01 RX ADMIN — ONDANSETRON 4 MG: 2 INJECTION INTRAMUSCULAR; INTRAVENOUS at 12:51

## 2024-01-01 RX ADMIN — SODIUM CHLORIDE 1000 ML: 9 INJECTION, SOLUTION INTRAVENOUS at 12:50

## 2024-01-01 RX ADMIN — IOPAMIDOL 100 ML: 755 INJECTION, SOLUTION INTRAVENOUS at 12:49

## 2024-01-01 NOTE — ED PROVIDER NOTES
Time: 12:50 PM EST  Date of encounter:  1/1/2024  Independent Historian/Clinical History and Information was obtained by:   Patient  Chief Complaint: Vomiting and diarrhea    History is limited by: N/A    History of Present Illness:  Patient is a 58 y.o. year old female who presents to the emergency department for evaluation of vomiting and diarrhea.  States that she began having vomiting and diarrhea around 4 AM.  The patient states that she has intractable nausea.  The patient's had multiple bouts of vomiting.  She denies any coffee-ground emesis or hematic emesis.  The patient states last time she vomited was 2 hours because at the last time she drank.  Patient notes numerous loose stools.  She denies any hematochezia or melena.  The patient's had no fever, rigors or myalgias.  The patient does have upper abdominal pain.  Patient denies any bad food exposure.  The patient has not been on any antibiotics in the last month.  The patient has no foreign travel.  The patient states that she has had similar symptoms before with pancreatitis.    HPI    Patient Care Team  Primary Care Provider: Carolynn Yanez APRN    Past Medical History:     No Known Allergies  Past Medical History:   Diagnosis Date    Diabetes mellitus     GERD (gastroesophageal reflux disease)     Hypertension      History reviewed. No pertinent surgical history.  Family History   Problem Relation Age of Onset    Colon cancer Neg Hx        Home Medications:  Prior to Admission medications    Medication Sig Start Date End Date Taking? Authorizing Provider   albuterol sulfate  (90 Base) MCG/ACT inhaler Inhale 2 puffs Every 6 (Six) Hours As Needed for Shortness of Air or Wheezing. 12/14/22   Brianna Francisco APRN   azithromycin (Zithromax Z-Jamir) 250 MG tablet Take 2 tablets by mouth on day 1, then 1 tablet daily on days 2-5 12/14/22   Brianna Francisco APRN   benzonatate (TESSALON) 200 MG capsule Take 1 capsule by mouth 3 (Three) Times a Day As Needed  for Cough. 12/14/22   Brianna Francisco APRN   colestipol (COLESTID) 1 g tablet  8/23/22   Princess Joaquin MD   dicyclomine (BENTYL) 20 MG tablet Take 1 tablet by mouth Every 6 (Six) Hours. 5/3/23   Danyell Springer MD   Invokana 300 MG tablet tablet  9/17/22   Princess Joaquin MD   levothyroxine (SYNTHROID, LEVOTHROID) 50 MCG tablet TAKE 1 TABLET BY MOUTH EVERY DAY FOR 90 DAYS 7/20/22   Princess Joaquin MD   lisinopril (PRINIVIL,ZESTRIL) 20 MG tablet TAKE 1 TABLET BY MOUTH EVERY DAY FOR 90 DAYS 7/26/22   Princess Joaquin MD   ondansetron ODT (ZOFRAN-ODT) 4 MG disintegrating tablet Place 1 tablet on the tongue Every 8 (Eight) Hours As Needed for Nausea or Vomiting. 5/3/23   Danyell Springer MD   pantoprazole (PROTONIX) 40 MG EC tablet Take 1 tablet by mouth Daily. 9/21/22   Christi Marks APRN   Sodium Sulfate-Mag Sulfate-KCl (Sutab) 5418-947-695 MG tablet Take 12 tablets by mouth Take As Directed. Take 12 tablets at 6 pm and 12 tablets 4 hours prior to procedure. 9/21/22   Christi Marks APRN   Trulicity 4.5 MG/0.5ML solution pen-injector  8/24/22   Princess Joaquin MD        Social History:   Social History     Tobacco Use    Smoking status: Never    Smokeless tobacco: Never   Vaping Use    Vaping Use: Never used   Substance Use Topics    Alcohol use: Never    Drug use: Never         Review of Systems:  Review of Systems   Constitutional:  Negative for chills, diaphoresis and fever.   HENT:  Negative for congestion, postnasal drip, rhinorrhea and sore throat.    Eyes:  Negative for photophobia.   Respiratory:  Negative for cough, chest tightness and shortness of breath.    Cardiovascular:  Negative for chest pain, palpitations and leg swelling.   Gastrointestinal:  Positive for abdominal pain, diarrhea, nausea and vomiting.   Genitourinary:  Negative for difficulty urinating, dysuria, flank pain, frequency, hematuria and urgency.   Musculoskeletal:  Negative for neck pain  "and neck stiffness.   Skin:  Negative for pallor and rash.   Neurological:  Negative for dizziness, syncope, weakness, numbness and headaches.   Hematological:  Negative for adenopathy. Does not bruise/bleed easily.   Psychiatric/Behavioral: Negative.          Physical Exam:  /67   Pulse 67   Temp 98.1 °F (36.7 °C) (Oral)   Resp 20   Ht 157.5 cm (62\")   Wt 63 kg (138 lb 14.2 oz)   SpO2 100%   BMI 25.40 kg/m²     Physical Exam  Vitals and nursing note reviewed.   Constitutional:       General: She is not in acute distress.     Appearance: Normal appearance. She is not ill-appearing, toxic-appearing or diaphoretic.   HENT:      Head: Normocephalic and atraumatic.      Mouth/Throat:      Mouth: Mucous membranes are moist.   Eyes:      Pupils: Pupils are equal, round, and reactive to light.   Cardiovascular:      Rate and Rhythm: Normal rate and regular rhythm.      Pulses: Normal pulses.           Carotid pulses are 2+ on the right side and 2+ on the left side.       Radial pulses are 2+ on the right side and 2+ on the left side.        Femoral pulses are 2+ on the right side and 2+ on the left side.       Popliteal pulses are 2+ on the right side and 2+ on the left side.        Dorsalis pedis pulses are 2+ on the right side and 2+ on the left side.        Posterior tibial pulses are 2+ on the right side and 2+ on the left side.      Heart sounds: Normal heart sounds. No murmur heard.  Pulmonary:      Effort: Pulmonary effort is normal. No accessory muscle usage, respiratory distress or retractions.      Breath sounds: Normal breath sounds. No wheezing, rhonchi or rales.   Abdominal:      General: Abdomen is flat. There is no distension.      Palpations: Abdomen is soft. There is no mass or pulsatile mass.      Tenderness: There is abdominal tenderness in the right upper quadrant, epigastric area and left upper quadrant. There is no right CVA tenderness, left CVA tenderness, guarding or rebound.      " Comments: No rigidity   Musculoskeletal:         General: No swelling, tenderness or deformity.      Cervical back: Neck supple. No tenderness.      Right lower leg: No edema.      Left lower leg: No edema.   Skin:     General: Skin is warm and dry.      Capillary Refill: Capillary refill takes less than 2 seconds.      Coloration: Skin is not jaundiced or pale.      Findings: No erythema.   Neurological:      General: No focal deficit present.      Mental Status: She is alert and oriented to person, place, and time. Mental status is at baseline.      Cranial Nerves: Cranial nerves 2-12 are intact. No cranial nerve deficit.      Sensory: Sensation is intact. No sensory deficit.      Motor: Motor function is intact. No weakness or pronator drift.      Coordination: Coordination is intact. Coordination normal.   Psychiatric:         Mood and Affect: Mood normal.         Behavior: Behavior normal.                  Procedures:  Procedures      Medical Decision Making:      Comorbidities that affect care:    GERD, diabetes, hypertension    External Notes reviewed:    None      The following orders were placed and all results were independently analyzed by me:  Orders Placed This Encounter   Procedures    Blood Culture - Blood,    Blood Culture - Blood,    CT Abdomen Pelvis With Contrast    [place] Draw    Comprehensive Metabolic Panel    Lipase    Urinalysis With Microscopic If Indicated (No Culture) - Urine, Clean Catch    Lactic Acid, Plasma    CBC Auto Differential    Magnesium    Undress & Gown    Please p.o. challenge patient notify physician with results  Nursing Communication    CBC & Differential    Green Top (Gel)    Lavender Top    Gold Top - SST    Light Blue Top       Medications Given in the Emergency Department:  Medications   sodium chloride 0.9 % bolus 1,000 mL (0 mL Intravenous Stopped 1/1/24 1400)   ondansetron (ZOFRAN) injection 4 mg (4 mg Intravenous Given 1/1/24 1251)   dicyclomine (BENTYL) injection  20 mg (20 mg Intramuscular Given 1/1/24 1254)   famotidine (PEPCID) injection 20 mg (20 mg Intravenous Given 1/1/24 1251)   iopamidol (ISOVUE-370) 76 % injection 100 mL (100 mL Intravenous Given 1/1/24 1249)        ED Course:         Labs:    Lab Results (last 24 hours)       ** No results found for the last 24 hours. **             Imaging:    No Radiology Exams Resulted Within Past 24 Hours      Differential Diagnosis and Discussion:    Vomiting: Differential diagnosis includes but is not limited to migraine, labyrinthine disorders, psychogenic, metabolic and endocrine causes, peptic ulcer, gastric outlet obstruction, gastritis, gastroenteritis, appendicitis, intestinal obstruction, paralytic ileus, food poisoning, cholecystitis, acute hepatitis, acute pancreatitis, acute febrile illness, and myocardial infarction.    All labs were reviewed and interpreted by me.  CT scan radiology impression was interpreted by me.    MDM  Number of Diagnoses or Management Options  Vomiting and diarrhea  Diagnosis management comments:       The patient comes to the ED for evaluation of vomiting.  Emesis is much improved in the ED.  The patient was given antiemetics in the ED.  The patient is resting comfortably and feels better, is alert and in no distress.  Repeat examination is unremarkable and benign.  In particular, there is no discomfort at McBurney's point.  The history, exam, and diagnostic testing and current condition does not suggest acute appendicitis, bowel obstruction, acute cholecystitis, bowel perforation, major gastrointestinal bleeding, severe diverticulitis, abdominal aortic aneurysm, mesenteric ischemia, volvulus, sepsis or other significant pathology that warrants further testing, continued ED treatment, admission for surgical evaluation at this point.  The vital signs have been stable.  Blood work performed shows no signs of acute renal failure.  The patient does not have uncontrollable pain, intractable  vomiting or other significant symptoms.  The patient is now able to tolerate p.o. intake in the ED and has passed a p.o. challenge.  The patient's condition is stable and appropriate for discharge from the emergency department.       Amount and/or Complexity of Data Reviewed  Clinical lab tests: reviewed  Tests in the radiology section of CPT®: reviewed             Social Determinants of Health:    Patient is independent, reliable, and has access to care.       Disposition and Care Coordination:    Discharged: The patient is suitable and stable for discharge with no need for consideration of observation or admission.    I have explained discharge medications and the need for follow up with the patient/caretakers. This was also printed in the discharge instructions. Patient was discharged with the following medications and follow up:      Medication List        Changed      * dicyclomine 20 MG tablet  Commonly known as: BENTYL  Take 1 tablet by mouth Every 6 (Six) Hours.  What changed: Another medication with the same name was added. Make sure you understand how and when to take each.     * dicyclomine 20 MG tablet  Commonly known as: BENTYL  Take 1 tablet by mouth Every 6 (Six) Hours for 7 days.  What changed: You were already taking a medication with the same name, and this prescription was added. Make sure you understand how and when to take each.     * ondansetron ODT 4 MG disintegrating tablet  Commonly known as: ZOFRAN-ODT  Place 1 tablet on the tongue Every 8 (Eight) Hours As Needed for Nausea or Vomiting.  What changed: Another medication with the same name was added. Make sure you understand how and when to take each.     * ondansetron ODT 4 MG disintegrating tablet  Commonly known as: ZOFRAN-ODT  Place 2 tablets on the tongue Every 8 (Eight) Hours As Needed for Nausea or Vomiting for up to 5 days.  What changed: You were already taking a medication with the same name, and this prescription was added. Make  sure you understand how and when to take each.           * This list has 4 medication(s) that are the same as other medications prescribed for you. Read the directions carefully, and ask your doctor or other care provider to review them with you.                   Where to Get Your Medications        These medications were sent to [facility]/pharmacy #[id number] - [place], [place] - [address] - [phone]  - [phone] FX  [address] [place] [address] [place] [address]      Hours: 24-hours Phone: [phone]   dicyclomine 20 MG tablet  ondansetron ODT 4 MG disintegrating tablet      Carolynn Yanez, APRN  [address]  [address]  [facility] [address]  [phone]    On 1/2/2024  Serial reexamination of the abdomen       Final diagnoses:   Vomiting and diarrhea        ED Disposition       ED Disposition   Discharge    Condition   Stable    Comment   --               This medical record created using voice recognition software.             Darrel Patel DO  01/02/24 1510

## 2024-01-01 NOTE — Clinical Note
[facility] EMERGENCY ROOM  [address]  [place] [address]  Phone: [phone]    Rossy Valdivia was seen and treated in our emergency department on 1/1/2024.  She may return to work on 01/04/2024.         Thank you for choosing [facility].    Darrel Patel DO

## 2024-01-06 LAB
BACTERIA SPEC AEROBE CULT: NORMAL
BACTERIA SPEC AEROBE CULT: NORMAL

## 2024-08-16 ENCOUNTER — APPOINTMENT (OUTPATIENT)
Dept: GENERAL RADIOLOGY | Facility: HOSPITAL | Age: 59
End: 2024-08-16
Payer: MEDICAID

## 2024-08-16 ENCOUNTER — HOSPITAL ENCOUNTER (EMERGENCY)
Facility: HOSPITAL | Age: 59
Discharge: HOME | End: 2024-08-16
Attending: EMERGENCY MEDICINE
Payer: MEDICAID

## 2024-08-16 VITALS
RESPIRATION RATE: 18 BRPM | DIASTOLIC BLOOD PRESSURE: 74 MMHG | WEIGHT: 133.5 LBS | SYSTOLIC BLOOD PRESSURE: 102 MMHG | BODY MASS INDEX: 24.56 KG/M2 | TEMPERATURE: 98.7 F | HEIGHT: 62 IN | HEART RATE: 78 BPM | OXYGEN SATURATION: 97 %

## 2024-08-16 DIAGNOSIS — S80.11XA CONTUSION OF MULTIPLE SITES OF RIGHT LOWER EXTREMITY, INITIAL ENCOUNTER: ICD-10-CM
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
## 2024-08-16 PROCEDURE — 99283 EMERGENCY DEPT VISIT LOW MDM: CPT

## 2024-08-16 PROCEDURE — 73590 X-RAY EXAM OF LOWER LEG: CPT

## 2024-08-16 RX ORDER — METHOCARBAMOL 750 MG/1
1500 TABLET, FILM COATED ORAL 3 TIMES DAILY PRN
Qty: 30 TABLET | Refills: 0 | Status: SHIPPED | OUTPATIENT
Start: 2024-08-16
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
## 2024-09-07 ENCOUNTER — HOSPITAL ENCOUNTER (EMERGENCY)
Facility: HOSPITAL | Age: 59
Discharge: HOME | End: 2024-09-07
Attending: EMERGENCY MEDICINE
Payer: COMMERCIAL

## 2024-09-07 VITALS
DIASTOLIC BLOOD PRESSURE: 88 MMHG | OXYGEN SATURATION: 99 % | HEIGHT: 62 IN | RESPIRATION RATE: 14 BRPM | TEMPERATURE: 97.7 F | BODY MASS INDEX: 26.09 KG/M2 | SYSTOLIC BLOOD PRESSURE: 140 MMHG | HEART RATE: 70 BPM | WEIGHT: 141.76 LBS

## 2024-09-07 DIAGNOSIS — E11.65 HYPERGLYCEMIA DUE TO DIABETES MELLITUS: Primary | ICD-10-CM

## 2024-09-07 LAB
ACETONE BLD QL: NEGATIVE
ALBUMIN SERPL-MCNC: 3.7 G/DL (ref 3.5–5.2)
ALBUMIN/GLOB SERPL: 1.2 G/DL
ALP SERPL-CCNC: 245 U/L (ref 39–117)
ALT SERPL W P-5'-P-CCNC: 18 U/L (ref 1–33)
ANION GAP SERPL CALCULATED.3IONS-SCNC: 10 MMOL/L (ref 5–15)
AST SERPL-CCNC: 13 U/L (ref 1–32)
BASOPHILS # BLD AUTO: 0.03 10*3/MM3 (ref 0–0.2)
BASOPHILS NFR BLD AUTO: 0.3 % (ref 0–1.5)
BILIRUB SERPL-MCNC: 0.3 MG/DL (ref 0–1.2)
BILIRUB UR QL STRIP: NEGATIVE
BUN SERPL-MCNC: 14 MG/DL (ref 6–20)
BUN/CREAT SERPL: 18.2 (ref 7–25)
CALCIUM SPEC-SCNC: 9.3 MG/DL (ref 8.6–10.5)
CHLORIDE SERPL-SCNC: 99 MMOL/L (ref 98–107)
CLARITY UR: CLEAR
CO2 SERPL-SCNC: 26 MMOL/L (ref 22–29)
COLOR UR: YELLOW
CREAT SERPL-MCNC: 0.77 MG/DL (ref 0.57–1)
DEPRECATED RDW RBC AUTO: 39.5 FL (ref 37–54)
EGFRCR SERPLBLD CKD-EPI 2021: 89.5 ML/MIN/1.73
EOSINOPHIL # BLD AUTO: 0.12 10*3/MM3 (ref 0–0.4)
EOSINOPHIL NFR BLD AUTO: 1.3 % (ref 0.3–6.2)
ERYTHROCYTE [DISTWIDTH] IN BLOOD BY AUTOMATED COUNT: 12.4 % (ref 12.3–15.4)
GLOBULIN UR ELPH-MCNC: 3.1 GM/DL
GLUCOSE BLDC GLUCOMTR-MCNC: 278 MG/DL (ref 70–99)
GLUCOSE BLDC GLUCOMTR-MCNC: 394 MG/DL (ref 70–99)
GLUCOSE SERPL-MCNC: 438 MG/DL (ref 65–99)
GLUCOSE UR STRIP-MCNC: ABNORMAL MG/DL
HCT VFR BLD AUTO: 42.4 % (ref 34–46.6)
HGB BLD-MCNC: 14.2 G/DL (ref 12–15.9)
HGB UR QL STRIP.AUTO: NEGATIVE
HOLD SPECIMEN: NORMAL
HOLD SPECIMEN: NORMAL
IMM GRANULOCYTES # BLD AUTO: 0.03 10*3/MM3 (ref 0–0.05)
IMM GRANULOCYTES NFR BLD AUTO: 0.3 % (ref 0–0.5)
KETONES UR QL STRIP: NEGATIVE
LEUKOCYTE ESTERASE UR QL STRIP.AUTO: NEGATIVE
LYMPHOCYTES # BLD AUTO: 3.03 10*3/MM3 (ref 0.7–3.1)
LYMPHOCYTES NFR BLD AUTO: 33.1 % (ref 19.6–45.3)
MCH RBC QN AUTO: 29 PG (ref 26.6–33)
MCHC RBC AUTO-ENTMCNC: 33.5 G/DL (ref 31.5–35.7)
MCV RBC AUTO: 86.7 FL (ref 79–97)
MONOCYTES # BLD AUTO: 0.55 10*3/MM3 (ref 0.1–0.9)
MONOCYTES NFR BLD AUTO: 6 % (ref 5–12)
NEUTROPHILS NFR BLD AUTO: 5.4 10*3/MM3 (ref 1.7–7)
NEUTROPHILS NFR BLD AUTO: 59 % (ref 42.7–76)
NITRITE UR QL STRIP: NEGATIVE
NRBC BLD AUTO-RTO: 0 /100 WBC (ref 0–0.2)
OSMOLALITY SERPL: 300 MOSM/KG (ref 275–295)
PH UR STRIP.AUTO: 5.5 [PH] (ref 5–8)
PLATELET # BLD AUTO: 303 10*3/MM3 (ref 140–450)
PMV BLD AUTO: 10.9 FL (ref 6–12)
POTASSIUM SERPL-SCNC: 4.4 MMOL/L (ref 3.5–5.2)
PROT SERPL-MCNC: 6.8 G/DL (ref 6–8.5)
PROT UR QL STRIP: NEGATIVE
RBC # BLD AUTO: 4.89 10*6/MM3 (ref 3.77–5.28)
SODIUM SERPL-SCNC: 135 MMOL/L (ref 136–145)
SP GR UR STRIP: >1.03 (ref 1–1.03)
UROBILINOGEN UR QL STRIP: ABNORMAL
WBC NRBC COR # BLD AUTO: 9.16 10*3/MM3 (ref 3.4–10.8)
WHOLE BLOOD HOLD COAG: NORMAL
WHOLE BLOOD HOLD SPECIMEN: NORMAL

## 2024-09-07 PROCEDURE — 85025 COMPLETE CBC W/AUTO DIFF WBC: CPT | Performed by: EMERGENCY MEDICINE

## 2024-09-07 PROCEDURE — 96360 HYDRATION IV INFUSION INIT: CPT

## 2024-09-07 PROCEDURE — 99283 EMERGENCY DEPT VISIT LOW MDM: CPT

## 2024-09-07 PROCEDURE — 80053 COMPREHEN METABOLIC PANEL: CPT | Performed by: EMERGENCY MEDICINE

## 2024-09-07 PROCEDURE — 81003 URINALYSIS AUTO W/O SCOPE: CPT

## 2024-09-07 PROCEDURE — 82948 REAGENT STRIP/BLOOD GLUCOSE: CPT

## 2024-09-07 PROCEDURE — 25810000003 SODIUM CHLORIDE 0.9 % SOLUTION

## 2024-09-07 PROCEDURE — 83930 ASSAY OF BLOOD OSMOLALITY: CPT

## 2024-09-07 PROCEDURE — 36415 COLL VENOUS BLD VENIPUNCTURE: CPT

## 2024-09-07 PROCEDURE — 82009 KETONE BODYS QUAL: CPT

## 2024-09-07 PROCEDURE — 83036 HEMOGLOBIN GLYCOSYLATED A1C: CPT

## 2024-09-07 PROCEDURE — 63710000001 INSULIN REGULAR HUMAN PER 5 UNITS

## 2024-09-07 RX ORDER — GLIPIZIDE 5 MG/1
5 TABLET ORAL
Qty: 60 TABLET | Refills: 0 | Status: SHIPPED | OUTPATIENT
Start: 2024-09-07 | End: 2024-10-07

## 2024-09-07 RX ADMIN — SODIUM CHLORIDE 1000 ML: 9 INJECTION, SOLUTION INTRAVENOUS at 21:35

## 2024-09-07 RX ADMIN — INSULIN HUMAN 6 UNITS: 100 INJECTION, SOLUTION PARENTERAL at 22:13

## 2024-09-08 LAB — HBA1C MFR BLD: 10.5 % (ref 4.8–5.6)

## 2024-09-08 NOTE — DISCHARGE INSTRUCTIONS
We were able to decrease your blood glucose levels to 278 today. We did give you insulin and fluids. Since your current diabetic medication is not working, I am changing it to Glipezide. Take this medication twice a day. Follow up with your PCP in 7-10 days. Continue to monitor your sugar at home.    Strict return to the emergency department if your blood glucose continue to remain high, you develop any nausea, vomiting, fever, abdominal pain.

## 2024-09-08 NOTE — ED PROVIDER NOTES
Time: 9:57 PM EDT  Date of encounter:  9/7/2024  Independent Historian/Clinical History and Information was obtained by:   Patient    History is limited by: N/A    Chief Complaint: Hyperglycemia      History of Present Illness:  Patient is a 58 y.o. year old female who presents to the emergency department for evaluation of hyperglycemia.  Patient states that her sugars been running in the 450s to 500 for the last week.  She was recently changed to nateglinide.  Patient was doing well with Invokana however, she lost her insurance and could not afford this medication.  States that her blood sugar has remained elevated even though she has been eating less.  Denies any nausea, vomiting, abdominal pain.  She reports neuropathic pain to her feet.      Patient Care Team  Primary Care Provider: Carolynn Yanez APRN    Past Medical History:     No Known Allergies  Past Medical History:   Diagnosis Date    Diabetes mellitus     GERD (gastroesophageal reflux disease)     Hypertension      History reviewed. No pertinent surgical history.  Family History   Problem Relation Age of Onset    Colon cancer Neg Hx        Home Medications:  Prior to Admission medications    Medication Sig Start Date End Date Taking? Authorizing Provider   albuterol sulfate  (90 Base) MCG/ACT inhaler Inhale 2 puffs Every 6 (Six) Hours As Needed for Shortness of Air or Wheezing. 12/14/22   Brianna Francisco APRN   azithromycin (Zithromax Z-Jamir) 250 MG tablet Take 2 tablets by mouth on day 1, then 1 tablet daily on days 2-5 12/14/22   Brianna Francisco APRN   benzonatate (TESSALON) 200 MG capsule Take 1 capsule by mouth 3 (Three) Times a Day As Needed for Cough. 12/14/22   Brianna Francisco APRN   colestipol (COLESTID) 1 g tablet  8/23/22   Princess Joaquin MD   dicyclomine (BENTYL) 20 MG tablet Take 1 tablet by mouth Every 6 (Six) Hours. 5/3/23   Danyell Springer MD   Invokana 300 MG tablet tablet  9/17/22   Princess Joaquin MD   levothyroxine  "(SYNTHROID, LEVOTHROID) 50 MCG tablet TAKE 1 TABLET BY MOUTH EVERY DAY FOR 90 DAYS 7/20/22   ProviderPrincess MD   lisinopril (PRINIVIL,ZESTRIL) 20 MG tablet TAKE 1 TABLET BY MOUTH EVERY DAY FOR 90 DAYS 7/26/22   Princess Joaquin MD   methocarbamol (ROBAXIN) 750 MG tablet Take 2 tablets by mouth 3 (Three) Times a Day As Needed for Muscle Spasms. 8/16/24   Rush Pierre MD   ondansetron ODT (ZOFRAN-ODT) 4 MG disintegrating tablet Place 1 tablet on the tongue Every 8 (Eight) Hours As Needed for Nausea or Vomiting. 5/3/23   Danyell Springer MD   pantoprazole (PROTONIX) 40 MG EC tablet Take 1 tablet by mouth Daily. 9/21/22   Christi Marks APRN   Sodium Sulfate-Mag Sulfate-KCl (Sutab) 8499-968-658 MG tablet Take 12 tablets by mouth Take As Directed. Take 12 tablets at 6 pm and 12 tablets 4 hours prior to procedure. 9/21/22   Christi Marks APRN   Trulicity 4.5 MG/0.5ML solution pen-injector  8/24/22   Provider, MD Princess        Social History:   Social History     Tobacco Use    Smoking status: Never    Smokeless tobacco: Never   Vaping Use    Vaping status: Never Used   Substance Use Topics    Alcohol use: Never    Drug use: Never         Review of Systems:  Review of Systems   Constitutional:  Negative for chills and fever.   HENT:  Negative for ear pain.    Eyes:  Negative for pain.   Respiratory:  Negative for cough and shortness of breath.    Cardiovascular:  Negative for chest pain.   Gastrointestinal:  Negative for abdominal pain, diarrhea, nausea and vomiting.   Genitourinary:  Negative for dysuria.   Musculoskeletal:  Negative for arthralgias.   Skin:  Negative for rash.   Neurological:  Negative for headaches.        Physical Exam:  /76 (Patient Position: Lying)   Pulse 69   Temp 97.8 °F (36.6 °C) (Oral)   Resp 14   Ht 157.5 cm (62\")   Wt 64.3 kg (141 lb 12.1 oz)   SpO2 94%   BMI 25.93 kg/m²     Physical Exam  Vitals and nursing note reviewed.   Constitutional:  "      Appearance: Normal appearance.   HENT:      Head: Normocephalic and atraumatic.      Nose: Nose normal.   Eyes:      Extraocular Movements: Extraocular movements intact.      Conjunctiva/sclera: Conjunctivae normal.      Pupils: Pupils are equal, round, and reactive to light.   Cardiovascular:      Rate and Rhythm: Normal rate and regular rhythm.      Pulses: Normal pulses.      Heart sounds: Normal heart sounds.   Pulmonary:      Effort: Pulmonary effort is normal.      Breath sounds: Normal breath sounds.   Abdominal:      General: Abdomen is flat.      Palpations: Abdomen is soft.   Musculoskeletal:         General: Normal range of motion.      Cervical back: Normal range of motion and neck supple.   Skin:     General: Skin is warm and dry.   Neurological:      General: No focal deficit present.      Mental Status: She is alert and oriented to person, place, and time.   Psychiatric:         Mood and Affect: Mood normal.         Behavior: Behavior normal.                  Procedures:  Procedures      Medical Decision Making:      Comorbidities that affect care:    Diabetes, hypertension, GERD    External Notes reviewed:    None      The following orders were placed and all results were independently analyzed by me:  Orders Placed This Encounter   Procedures    Comprehensive Metabolic Panel    [facility] Draw    CBC Auto Differential    Urinalysis With Culture If Indicated - Urine, Clean Catch    Acetone    Osmolality, Serum    Hemoglobin A1c    POC Glucose STAT    POC Glucose Once    POC Glucose STAT    CBC & Differential    Green Top (Gel)    Lavender Top    Gold Top - SST    Light Blue Top       Medications Given in the Emergency Department:  Medications   sodium chloride 0.9 % bolus 1,000 mL (1,000 mL Intravenous New Bag 9/7/24 2135)   insulin regular (humuLIN R,novoLIN R) injection 6 Units (6 Units Subcutaneous Given 9/7/24 2213)        ED Course:    ED Course as of 09/07/24 2311   Sat Sep 07, 2024   2052  Glucose(!): 394 [MV]   2309 Glucose(!): 278 [MV]      ED Course User Index  [MV] Saturnino Live PA       Labs:    Lab Results (last 24 hours)       Procedure Component Value Units Date/Time    POC Glucose Once [[id number]]  (Abnormal) Collected: 09/07/24 2049    Specimen: Blood Updated: 09/07/24 2051     Glucose 394 mg/dL      Comment: Serial Number: [id number]:  [id number]       CBC & Differential [[id number]]  (Normal) Collected: 09/07/24 2054    Specimen: Blood Updated: 09/07/24 2104    Narrative:      The following orders were created for panel order CBC & Differential.  Procedure                               Abnormality         Status                     ---------                               -----------         ------                     CBC Auto Differential[[id number]]        Normal              Final result                 Please view results for these tests on the individual orders.    Comprehensive Metabolic Panel [[id number]]  (Abnormal) Collected: 09/07/24 2054    Specimen: Blood Updated: 09/07/24 2140     Glucose 438 mg/dL      BUN 14 mg/dL      Creatinine 0.77 mg/dL      Sodium 135 mmol/L      Potassium 4.4 mmol/L      Comment: Slight hemolysis detected by analyzer. Result may be falsely elevated.        Chloride 99 mmol/L      CO2 26.0 mmol/L      Calcium 9.3 mg/dL      Total Protein 6.8 g/dL      Albumin 3.7 g/dL      ALT (SGPT) 18 U/L      AST (SGOT) 13 U/L      Alkaline Phosphatase 245 U/L      Total Bilirubin 0.3 mg/dL      Globulin 3.1 gm/dL      A/G Ratio 1.2 g/dL      BUN/Creatinine Ratio 18.2     Anion Gap 10.0 mmol/L      eGFR 89.5 mL/min/1.73     Narrative:      GFR Normal >60  Chronic Kidney Disease <60  Kidney Failure <15      CBC Auto Differential [[id number]]  (Normal) Collected: 09/07/24 2054    Specimen: Blood Updated: 09/07/24 2104     WBC 9.16 10*3/mm3      RBC 4.89 10*6/mm3      Hemoglobin 14.2 g/dL      Hematocrit 42.4 %      MCV 86.7 fL      MCH 29.0 pg      MCHC 33.5 g/dL       RDW 12.4 %      RDW-SD 39.5 fl      MPV 10.9 fL      Platelets 303 10*3/mm3      Neutrophil % 59.0 %      Lymphocyte % 33.1 %      Monocyte % 6.0 %      Eosinophil % 1.3 %      Basophil % 0.3 %      Immature Grans % 0.3 %      Neutrophils, Absolute 5.40 10*3/mm3      Lymphocytes, Absolute 3.03 10*3/mm3      Monocytes, Absolute 0.55 10*3/mm3      Eosinophils, Absolute 0.12 10*3/mm3      Basophils, Absolute 0.03 10*3/mm3      Immature Grans, Absolute 0.03 10*3/mm3      nRBC 0.0 /100 WBC     Acetone [[id number]]  (Normal) Collected: 09/07/24 2054    Specimen: Blood Updated: 09/07/24 2139     Acetone Negative    Osmolality, Serum [[id number]]  (Abnormal) Collected: 09/07/24 2054    Specimen: Blood Updated: 09/07/24 2200     Osmolality 300 mOsm/kg     Hemoglobin A1c [[id number]] Collected: 09/07/24 2054    Specimen: Blood Updated: 09/07/24 2100    Urinalysis With Culture If Indicated - Urine, Clean Catch [[id number]]  (Abnormal) Collected: 09/07/24 2058    Specimen: Urine, Clean Catch Updated: 09/07/24 2107     Color, UA Yellow     Appearance, UA Clear     pH, UA 5.5     Specific Gravity, UA >1.030     Glucose, UA >=1000 mg/dL (3+)     Ketones, UA Negative     Bilirubin, UA Negative     Blood, UA Negative     Protein, UA Negative     Leuk Esterase, UA Negative     Nitrite, UA Negative     Urobilinogen, UA 0.2 E.U./dL    Narrative:      In absence of clinical symptoms, the presence of pyuria, bacteria, and/or nitrites on the urinalysis result does not correlate with infection.  Urine microscopic not indicated.    POC Glucose STAT [[id number]]  (Abnormal) Collected: 09/07/24 2304    Specimen: Blood Updated: 09/07/24 2307     Glucose 278 mg/dL      Comment: Serial Number: [id number]:  [id number]                Imaging:    No Radiology Exams Resulted Within Past 24 Hours      Differential Diagnosis and Discussion:    Abdominal Pain: Based on the patient's signs and symptoms, I considered abdominal aortic aneurysm, small  bowel obstruction, pancreatitis, acute cholecystitis, acute appendecitis, peptic ulcer disease, gastritis, colitis, endocrine disorders, irritable bowel syndrome and other differential diagnosis an etiology of the patient's abdominal pain.    All labs were reviewed and interpreted by me.    MDM     Amount and/or Complexity of Data Reviewed  Clinical lab tests: reviewed  Decide to obtain previous medical records or to obtain history from someone other than the patient: yes    Risk of Complications, Morbidity, and/or Mortality  Presenting problems: moderate  Diagnostic procedures: low  Management options: low    Patient Progress  Patient progress: stable    Patient presents to the emergency department for evaluation of hyperglycemia.  Patient states that her sugars been running in the 450s to 500 for the last week.  She was recently changed to nateglinide.  Patient was doing well with Invokana however, she lost her insurance and could not afford this medication.  States that her blood sugar has remained elevated even though she has been eating less.  Denies any nausea, vomiting, abdominal pain.  She reports neuropathic pain to her feet.    On exam, lungs are clear to auscultation bilaterally.  Abdomen is soft and nontender in all quadrants.    The patient´s CBC that was reviewed and interpreted by me shows no abnormalities of critical concern. Of note, there is no anemia requiring a blood transfusion and the platelet count is acceptable.    The patient´s CMP that was reviewed and interpretted by me. Of note, the patient´s sodium and potassium are acceptable. The patient´s liver enzymes are unremarkable. The patient´s renal function (creatinine) is preserved. The patient has a normal anion gap.    Glucose elevated at 438.  Osmolality 300.  Acetone negative.  Started the patient on a bolus of fluids.    Will change the patient to glipizide 5mg BID.  Patient will follow-up with her PCP in 3 to 5  days.                  Patient Care Considerations:    ANTIBIOTICS: I considered prescribing antibiotics as an outpatient however no bacterial focus of infection was found.      Consultants/Shared Management Plan:    None    Social Determinants of Health:    Patient is independent, reliable, and has access to care.       Disposition and Care Coordination:    Discharged: The patient is suitable and stable for discharge with no need for consideration of admission.    I have explained the patient´s condition, diagnoses and treatment plan based on the information available to me at this time. I have answered questions and addressed any concerns. The patient has a good  understanding of the patient´s diagnosis, condition, and treatment plan as can be expected at this point. The vital signs have been stable. The patient´s condition is stable and appropriate for discharge from the emergency department.      The patient will pursue further outpatient evaluation with the primary care physician or other designated or consulting physician as outlined in the discharge instructions. They are agreeable to this plan of care and follow-up instructions have been explained in detail. The patient has received these instructions in written format and has expressed an understanding of the discharge instructions. The patient is aware that any significant change in condition or worsening of symptoms should prompt an immediate return to this or the closest emergency department or call to 911.  I have explained discharge medications and the need for follow up with the patient/caretakers. This was also printed in the discharge instructions. Patient was discharged with the following medications and follow up:      Medication List        New Prescriptions      glipizide 5 MG tablet  Commonly known as: Glucotrol  Take 1 tablet by mouth 2 (Two) Times a Day Before Meals for 30 days.               Where to Get Your Medications        These  medications were sent to [facility]/pharmacy #[id number] - [place], [place] - [address] - [phone] [facility] [phone] FX  [address] [place] [address]      Hours: 24-hours Phone: [phone]   glipizide 5 MG tablet      No follow-up provider specified.     Final diagnoses:   Hyperglycemia due to diabetes mellitus        ED Disposition       ED Disposition   Discharge    Condition   Stable    Comment   --               This medical record created using voice recognition software.             Saturnino Live PA  09/07/24 9058

## 2025-04-22 ENCOUNTER — APPOINTMENT (OUTPATIENT)
Dept: CT IMAGING | Facility: HOSPITAL | Age: 60
End: 2025-04-22
Payer: COMMERCIAL

## 2025-04-22 ENCOUNTER — HOSPITAL ENCOUNTER (EMERGENCY)
Facility: HOSPITAL | Age: 60
Discharge: HOME | End: 2025-04-22
Attending: EMERGENCY MEDICINE | Admitting: EMERGENCY MEDICINE
Payer: COMMERCIAL

## 2025-04-22 VITALS
DIASTOLIC BLOOD PRESSURE: 69 MMHG | BODY MASS INDEX: 25.27 KG/M2 | RESPIRATION RATE: 16 BRPM | HEIGHT: 62 IN | WEIGHT: 137.35 LBS | OXYGEN SATURATION: 95 % | SYSTOLIC BLOOD PRESSURE: 113 MMHG | TEMPERATURE: 98.2 F | HEART RATE: 70 BPM

## 2025-04-22 DIAGNOSIS — K52.9 ENTEROCOLITIS: Primary | ICD-10-CM

## 2025-04-22 LAB
027 TOXIN: NORMAL
ALBUMIN SERPL-MCNC: 4 G/DL (ref 3.5–5.2)
ALBUMIN/GLOB SERPL: 1 G/DL
ALP SERPL-CCNC: 186 U/L (ref 39–117)
ALT SERPL W P-5'-P-CCNC: 42 U/L (ref 1–33)
ANION GAP SERPL CALCULATED.3IONS-SCNC: 14.3 MMOL/L (ref 5–15)
AST SERPL-CCNC: 25 U/L (ref 1–32)
BASOPHILS # BLD AUTO: 0.03 10*3/MM3 (ref 0–0.2)
BASOPHILS NFR BLD AUTO: 0.3 % (ref 0–1.5)
BILIRUB SERPL-MCNC: 0.5 MG/DL (ref 0–1.2)
BILIRUB UR QL STRIP: NEGATIVE
BUN SERPL-MCNC: 13 MG/DL (ref 6–20)
BUN/CREAT SERPL: 16.9 (ref 7–25)
C COLI+JEJ+UPSA DNA STL QL NAA+NON-PROBE: NOT DETECTED
C DIFF TOX GENS STL QL NAA+PROBE: NEGATIVE
CALCIUM SPEC-SCNC: 9.7 MG/DL (ref 8.6–10.5)
CHLORIDE SERPL-SCNC: 100 MMOL/L (ref 98–107)
CLARITY UR: CLEAR
CO2 SERPL-SCNC: 21.7 MMOL/L (ref 22–29)
COLOR UR: YELLOW
CREAT SERPL-MCNC: 0.77 MG/DL (ref 0.57–1)
D-LACTATE SERPL-SCNC: 1.6 MMOL/L (ref 0.5–2)
DEPRECATED RDW RBC AUTO: 40.4 FL (ref 37–54)
EC STX1+STX2 GENES STL QL NAA+NON-PROBE: NOT DETECTED
EGFRCR SERPLBLD CKD-EPI 2021: 89 ML/MIN/1.73
EOSINOPHIL # BLD AUTO: 0.06 10*3/MM3 (ref 0–0.4)
EOSINOPHIL NFR BLD AUTO: 0.7 % (ref 0.3–6.2)
ERYTHROCYTE [DISTWIDTH] IN BLOOD BY AUTOMATED COUNT: 12.4 % (ref 12.3–15.4)
GLOBULIN UR ELPH-MCNC: 3.9 GM/DL
GLUCOSE SERPL-MCNC: 214 MG/DL (ref 65–99)
GLUCOSE UR STRIP-MCNC: ABNORMAL MG/DL
HCT VFR BLD AUTO: 53.5 % (ref 34–46.6)
HGB BLD-MCNC: 17.9 G/DL (ref 12–15.9)
HGB UR QL STRIP.AUTO: NEGATIVE
HOLD SPECIMEN: NORMAL
HOLD SPECIMEN: NORMAL
IMM GRANULOCYTES # BLD AUTO: 0.04 10*3/MM3 (ref 0–0.05)
IMM GRANULOCYTES NFR BLD AUTO: 0.4 % (ref 0–0.5)
KETONES UR QL STRIP: ABNORMAL
LEUKOCYTE ESTERASE UR QL STRIP.AUTO: NEGATIVE
LIPASE SERPL-CCNC: 25 U/L (ref 13–60)
LYMPHOCYTES # BLD AUTO: 1.53 10*3/MM3 (ref 0.7–3.1)
LYMPHOCYTES NFR BLD AUTO: 17.2 % (ref 19.6–45.3)
MCH RBC QN AUTO: 29.7 PG (ref 26.6–33)
MCHC RBC AUTO-ENTMCNC: 33.5 G/DL (ref 31.5–35.7)
MCV RBC AUTO: 88.9 FL (ref 79–97)
MONOCYTES # BLD AUTO: 0.69 10*3/MM3 (ref 0.1–0.9)
MONOCYTES NFR BLD AUTO: 7.7 % (ref 5–12)
NEUTROPHILS NFR BLD AUTO: 6.57 10*3/MM3 (ref 1.7–7)
NEUTROPHILS NFR BLD AUTO: 73.7 % (ref 42.7–76)
NITRITE UR QL STRIP: NEGATIVE
NRBC BLD AUTO-RTO: 0 /100 WBC (ref 0–0.2)
PH UR STRIP.AUTO: <=5 [PH] (ref 5–8)
PLATELET # BLD AUTO: 268 10*3/MM3 (ref 140–450)
PMV BLD AUTO: 11.1 FL (ref 6–12)
POTASSIUM SERPL-SCNC: 3.9 MMOL/L (ref 3.5–5.2)
PROT SERPL-MCNC: 7.9 G/DL (ref 6–8.5)
PROT UR QL STRIP: ABNORMAL
RBC # BLD AUTO: 6.02 10*6/MM3 (ref 3.77–5.28)
S ENT+BONG DNA STL QL NAA+NON-PROBE: NOT DETECTED
SHIGELLA SP+EIEC IPAH ST NAA+NON-PROBE: NOT DETECTED
SODIUM SERPL-SCNC: 136 MMOL/L (ref 136–145)
SP GR UR STRIP: >1.03 (ref 1–1.03)
UROBILINOGEN UR QL STRIP: ABNORMAL
WBC NRBC COR # BLD AUTO: 8.92 10*3/MM3 (ref 3.4–10.8)
WHOLE BLOOD HOLD COAG: NORMAL
WHOLE BLOOD HOLD SPECIMEN: NORMAL

## 2025-04-22 PROCEDURE — 83605 ASSAY OF LACTIC ACID: CPT | Performed by: EMERGENCY MEDICINE

## 2025-04-22 PROCEDURE — 25810000003 SODIUM CHLORIDE 0.9 % SOLUTION: Performed by: EMERGENCY MEDICINE

## 2025-04-22 PROCEDURE — 87493 C DIFF AMPLIFIED PROBE: CPT | Performed by: NURSE PRACTITIONER

## 2025-04-22 PROCEDURE — 25510000001 IOPAMIDOL PER 1 ML: Performed by: EMERGENCY MEDICINE

## 2025-04-22 PROCEDURE — 85025 COMPLETE CBC W/AUTO DIFF WBC: CPT | Performed by: EMERGENCY MEDICINE

## 2025-04-22 PROCEDURE — 83690 ASSAY OF LIPASE: CPT | Performed by: EMERGENCY MEDICINE

## 2025-04-22 PROCEDURE — 96374 THER/PROPH/DIAG INJ IV PUSH: CPT

## 2025-04-22 PROCEDURE — 96361 HYDRATE IV INFUSION ADD-ON: CPT

## 2025-04-22 PROCEDURE — 74177 CT ABD & PELVIS W/CONTRAST: CPT

## 2025-04-22 PROCEDURE — 87505 NFCT AGENT DETECTION GI: CPT | Performed by: NURSE PRACTITIONER

## 2025-04-22 PROCEDURE — 99285 EMERGENCY DEPT VISIT HI MDM: CPT

## 2025-04-22 PROCEDURE — 81003 URINALYSIS AUTO W/O SCOPE: CPT | Performed by: EMERGENCY MEDICINE

## 2025-04-22 PROCEDURE — 80053 COMPREHEN METABOLIC PANEL: CPT | Performed by: EMERGENCY MEDICINE

## 2025-04-22 PROCEDURE — 25010000002 KETOROLAC TROMETHAMINE PER 15 MG: Performed by: NURSE PRACTITIONER

## 2025-04-22 RX ORDER — SODIUM CHLORIDE 0.9 % (FLUSH) 0.9 %
10 SYRINGE (ML) INJECTION AS NEEDED
Status: DISCONTINUED | OUTPATIENT
Start: 2025-04-22 | End: 2025-04-22 | Stop reason: HOSPADM

## 2025-04-22 RX ORDER — DICYCLOMINE HCL 20 MG
20 TABLET ORAL EVERY 6 HOURS PRN
Qty: 30 TABLET | Refills: 0 | Status: SHIPPED | OUTPATIENT
Start: 2025-04-22

## 2025-04-22 RX ORDER — KETOROLAC TROMETHAMINE 15 MG/ML
15 INJECTION, SOLUTION INTRAMUSCULAR; INTRAVENOUS ONCE
Status: COMPLETED | OUTPATIENT
Start: 2025-04-22 | End: 2025-04-22

## 2025-04-22 RX ORDER — IOPAMIDOL 755 MG/ML
100 INJECTION, SOLUTION INTRAVASCULAR
Status: COMPLETED | OUTPATIENT
Start: 2025-04-22 | End: 2025-04-22

## 2025-04-22 RX ORDER — DIPHENOXYLATE HYDROCHLORIDE AND ATROPINE SULFATE 2.5; .025 MG/1; MG/1
2 TABLET ORAL ONCE
Status: COMPLETED | OUTPATIENT
Start: 2025-04-22 | End: 2025-04-22

## 2025-04-22 RX ORDER — LOPERAMIDE HYDROCHLORIDE 2 MG/1
2 CAPSULE ORAL 4 TIMES DAILY PRN
Qty: 20 CAPSULE | Refills: 0 | Status: SHIPPED | OUTPATIENT
Start: 2025-04-22

## 2025-04-22 RX ORDER — DICYCLOMINE HYDROCHLORIDE 10 MG/1
20 CAPSULE ORAL ONCE
Status: COMPLETED | OUTPATIENT
Start: 2025-04-22 | End: 2025-04-22

## 2025-04-22 RX ADMIN — KETOROLAC TROMETHAMINE 15 MG: 15 INJECTION, SOLUTION INTRAMUSCULAR; INTRAVENOUS at 03:34

## 2025-04-22 RX ADMIN — SODIUM CHLORIDE 1000 ML: 9 INJECTION, SOLUTION INTRAVENOUS at 02:51

## 2025-04-22 RX ADMIN — IOPAMIDOL 100 ML: 755 INJECTION, SOLUTION INTRAVENOUS at 03:46

## 2025-04-22 RX ADMIN — DICYCLOMINE HYDROCHLORIDE 20 MG: 10 CAPSULE ORAL at 03:34

## 2025-04-22 RX ADMIN — DIPHENOXYLATE HYDROCHLORIDE AND ATROPINE SULFATE 2 TABLET: 2.5; .025 TABLET ORAL at 04:36

## 2025-04-22 NOTE — ED PROVIDER NOTES
Time: 2:40 AM EDT  Date of encounter:  4/22/2025  Independent Historian/Clinical History and Information was obtained by:   Patient    History is limited by: N/A    Chief Complaint: Abdominal pain with diarrhea      History of Present Illness:  Patient is a 59 y.o. year old female who presents to the emergency department for evaluation of left-sided abdominal pain with diarrhea.  Patient states she is to the point now where she has diarrhea so bad that she has to wear a pad because she cannot make it to the bathroom in time.  Denies any blood in the stool.  No nausea or vomiting.  No fever.  Pain is intermittent and cramping in the left side.  No dysuria.  No recent sick contacts.  No recent antibiotics.  No recent travel.      Patient Care Team  Primary Care Provider: Carolynn Yanez APRN    Past Medical History:     No Known Allergies  Past Medical History:   Diagnosis Date    Diabetes mellitus     GERD (gastroesophageal reflux disease)     Hypertension      History reviewed. No pertinent surgical history.  Family History   Problem Relation Age of Onset    Colon cancer Neg Hx        Home Medications:  Prior to Admission medications    Medication Sig Start Date End Date Taking? Authorizing Provider   albuterol sulfate  (90 Base) MCG/ACT inhaler Inhale 2 puffs Every 6 (Six) Hours As Needed for Shortness of Air or Wheezing. 12/14/22   Brianna Francisco APRN   azithromycin (Zithromax Z-Jamir) 250 MG tablet Take 2 tablets by mouth on day 1, then 1 tablet daily on days 2-5 12/14/22   Brianna Francisco APRN   benzonatate (TESSALON) 200 MG capsule Take 1 capsule by mouth 3 (Three) Times a Day As Needed for Cough. 12/14/22   Brianna Francisco APRN   colestipol (COLESTID) 1 g tablet  8/23/22   ProviderPrincess MD   dicyclomine (BENTYL) 20 MG tablet Take 1 tablet by mouth Every 6 (Six) Hours. 5/3/23   Danyell Springer MD   glipizide (Glucotrol) 5 MG tablet Take 1 tablet by mouth 2 (Two) Times a Day Before Meals for 30 days.  9/7/24 10/7/24  Saturnino Live PA   Invokana 300 MG tablet tablet  9/17/22   Princess Joaquin MD   levothyroxine (SYNTHROID, LEVOTHROID) 50 MCG tablet TAKE 1 TABLET BY MOUTH EVERY DAY FOR 90 DAYS 7/20/22   Princess Joaquin MD   lisinopril (PRINIVIL,ZESTRIL) 20 MG tablet TAKE 1 TABLET BY MOUTH EVERY DAY FOR 90 DAYS 7/26/22   Princess Joaquin MD   methocarbamol (ROBAXIN) 750 MG tablet Take 2 tablets by mouth 3 (Three) Times a Day As Needed for Muscle Spasms. 8/16/24   Rush Pierre MD   ondansetron ODT (ZOFRAN-ODT) 4 MG disintegrating tablet Place 1 tablet on the tongue Every 8 (Eight) Hours As Needed for Nausea or Vomiting. 5/3/23   Danyell Springer MD   pantoprazole (PROTONIX) 40 MG EC tablet Take 1 tablet by mouth Daily. 9/21/22   Christi Marks APRN   Sodium Sulfate-Mag Sulfate-KCl (Sutab) 7167-352-904 MG tablet Take 12 tablets by mouth Take As Directed. Take 12 tablets at 6 pm and 12 tablets 4 hours prior to procedure. 9/21/22   Christi Marks APRN   Trulicity 4.5 MG/0.5ML solution pen-injector  8/24/22   Princess Joaquin MD        Social History:   Social History     Tobacco Use    Smoking status: Never    Smokeless tobacco: Never   Vaping Use    Vaping status: Never Used   Substance Use Topics    Alcohol use: Never    Drug use: Never         Review of Systems:  Review of Systems   Constitutional:  Negative for chills and fever.   HENT:  Negative for congestion, ear pain and sore throat.    Eyes:  Negative for pain.   Respiratory:  Negative for cough, chest tightness and shortness of breath.    Cardiovascular:  Negative for chest pain.   Gastrointestinal:  Positive for abdominal pain and diarrhea. Negative for nausea and vomiting.   Genitourinary:  Negative for dysuria, flank pain and hematuria.   Musculoskeletal:  Negative for joint swelling.   Skin:  Negative for pallor.   Neurological:  Negative for seizures and headaches.   Psychiatric/Behavioral: Negative.     All other  "systems reviewed and are negative.       Physical Exam:  /98 (BP Location: Right arm, Patient Position: Lying)   Pulse 92   Temp 98.2 °F (36.8 °C) (Oral)   Resp 16   Ht 157.5 cm (62\")   Wt 62.3 kg (137 lb 5.6 oz)   SpO2 96%   BMI 25.12 kg/m²     Physical Exam  Vitals and nursing note reviewed.   Constitutional:       General: She is not in acute distress.     Appearance: Normal appearance. She is not toxic-appearing.   HENT:      Head: Normocephalic and atraumatic.      Mouth/Throat:      Mouth: Mucous membranes are moist.   Eyes:      General: No scleral icterus.  Cardiovascular:      Rate and Rhythm: Normal rate and regular rhythm.      Pulses: Normal pulses.      Heart sounds: Normal heart sounds.   Pulmonary:      Effort: Pulmonary effort is normal. No respiratory distress.      Breath sounds: Normal breath sounds.   Abdominal:      General: Abdomen is flat. Bowel sounds are normal.      Palpations: Abdomen is soft.      Tenderness: There is abdominal tenderness in the left upper quadrant and left lower quadrant. There is no right CVA tenderness or left CVA tenderness.      Hernia: No hernia is present.   Musculoskeletal:         General: Normal range of motion.      Cervical back: Normal range of motion and neck supple.   Skin:     General: Skin is warm and dry.   Neurological:      Mental Status: She is alert and oriented to person, place, and time. Mental status is at baseline.   Psychiatric:         Mood and Affect: Mood normal.         Behavior: Behavior normal.                  Medical Decision Making:      Comorbidities that affect care:    gerd, Diabetes, Hypertension    External Notes reviewed:    Previous Clinic Note: Patient last seen by PCP for management of diabetes and a high A1c on March 19      The following orders were placed and all results were independently analyzed by me:  Orders Placed This Encounter   Procedures    Enteric Bacterial Panel - Stool, Per Rectum    Clostridioides " difficile Toxin - Stool, Per Rectum    Clostridioides difficile Toxin, PCR - Stool, Per Rectum    CT Abdomen Pelvis With Contrast    [place] Draw    Comprehensive Metabolic Panel    Lipase    Urinalysis With Microscopic If Indicated (No Culture) - Urine, Clean Catch    Lactic Acid, Plasma    CBC Auto Differential    NPO Diet NPO Type: Strict NPO    Undress & Gown    Insert Peripheral IV    CBC & Differential    Green Top (Gel)    Lavender Top    Gold Top - SST    Light Blue Top       Medications Given in the Emergency Department:  Medications   sodium chloride 0.9 % flush 10 mL (has no administration in time range)   sodium chloride 0.9 % bolus 1,000 mL (0 mL Intravenous Stopped 4/22/25 0412)   dicyclomine (BENTYL) capsule 20 mg (20 mg Oral Given 4/22/25 0334)   ketorolac (TORADOL) injection 15 mg (15 mg Intravenous Given 4/22/25 0334)   iopamidol (ISOVUE-370) 76 % injection 100 mL (100 mL Intravenous Given 4/22/25 0346)   diphenoxylate-atropine (LOMOTIL) 2.5-0.025 MG per tablet 2 tablet (2 tablets Oral Given 4/22/25 0436)        ED Course:    ED Course as of 04/22/25 0447 Tue Apr 22, 2025 0417 CT Abdomen Pelvis With Contrast  Uncomplicated enterocolitis [DS]      ED Course User Index  [DS] Brianna Francisco, AMANDA       Labs:    Lab Results (last 24 hours)       Procedure Component Value Units Date/Time    CBC & Differential [[id number]]  (Abnormal) Collected: 04/22/25 0253    Specimen: Blood Updated: 04/22/25 0303    Narrative:      The following orders were created for panel order CBC & Differential.  Procedure                               Abnormality         Status                     ---------                               -----------         ------                     CBC Auto Differential[[id number]]        Abnormal            Final result                 Please view results for these tests on the individual orders.    Comprehensive Metabolic Panel [[id number]]  (Abnormal) Collected: 04/22/25 0253    Specimen:  Blood Updated: 04/22/25 0321     Glucose 214 mg/dL      BUN 13 mg/dL      Creatinine 0.77 mg/dL      Sodium 136 mmol/L      Potassium 3.9 mmol/L      Comment: Slight hemolysis detected by analyzer. Result may be falsely elevated.        Chloride 100 mmol/L      CO2 21.7 mmol/L      Calcium 9.7 mg/dL      Total Protein 7.9 g/dL      Albumin 4.0 g/dL      ALT (SGPT) 42 U/L      AST (SGOT) 25 U/L      Alkaline Phosphatase 186 U/L      Total Bilirubin 0.5 mg/dL      Globulin 3.9 gm/dL      A/G Ratio 1.0 g/dL      BUN/Creatinine Ratio 16.9     Anion Gap 14.3 mmol/L      eGFR 89.0 mL/min/1.73     Narrative:      GFR Categories in Chronic Kidney Disease (CKD)      GFR Category          GFR (mL/min/1.73)    Interpretation  G1                     90 or greater         Normal or high (1)  G2                      60-89                Mild decrease (1)  G3a                   45-59                Mild to moderate decrease  G3b                   30-44                Moderate to severe decrease  G4                    15-29                Severe decrease  G5                    14 or less           Kidney failure          (1)In the absence of evidence of kidney disease, neither GFR category G1 or G2 fulfill the criteria for CKD.    eGFR calculation 2021 CKD-EPI creatinine equation, which does not include race as a factor    Lipase [[id number]]  (Normal) Collected: 04/22/25 0253    Specimen: Blood Updated: 04/22/25 0321     Lipase 25 U/L     Lactic Acid, Plasma [[id number]]  (Normal) Collected: 04/22/25 0253    Specimen: Blood Updated: 04/22/25 0318     Lactate 1.6 mmol/L     CBC Auto Differential [[id number]]  (Abnormal) Collected: 04/22/25 0253    Specimen: Blood Updated: 04/22/25 0303     WBC 8.92 10*3/mm3      RBC 6.02 10*6/mm3      Hemoglobin 17.9 g/dL      Hematocrit 53.5 %      MCV 88.9 fL      MCH 29.7 pg      MCHC 33.5 g/dL      RDW 12.4 %      RDW-SD 40.4 fl      MPV 11.1 fL      Platelets 268 10*3/mm3      Neutrophil % 73.7 %       Lymphocyte % 17.2 %      Monocyte % 7.7 %      Eosinophil % 0.7 %      Basophil % 0.3 %      Immature Grans % 0.4 %      Neutrophils, Absolute 6.57 10*3/mm3      Lymphocytes, Absolute 1.53 10*3/mm3      Monocytes, Absolute 0.69 10*3/mm3      Eosinophils, Absolute 0.06 10*3/mm3      Basophils, Absolute 0.03 10*3/mm3      Immature Grans, Absolute 0.04 10*3/mm3      nRBC 0.0 /100 WBC     Enteric Bacterial Panel - Stool, Per Rectum [[id number]] Collected: 04/22/25 0334    Specimen: Stool from Per Rectum Updated: 04/22/25 0341    Clostridioides difficile Toxin - Stool, Per Rectum [[id number]] Collected: 04/22/25 0334    Specimen: Stool from Per Rectum Updated: 04/22/25 0443    Narrative:      The following orders were created for panel order Clostridioides difficile Toxin - Stool, Per Rectum.  Procedure                               Abnormality         Status                     ---------                               -----------         ------                     Clostridioides difficile...[[id number]]                      Final result                 Please view results for these tests on the individual orders.    Clostridioides difficile Toxin, PCR - Stool, Per Rectum [[id number]] Collected: 04/22/25 0334    Specimen: Stool from Per Rectum Updated: 04/22/25 0443     Toxigenic C. difficile by PCR Negative     027 Toxin Presumptive Negative    Narrative:      The result indicates the absence of toxigenic C. difficile from stool specimen.     Urinalysis With Microscopic If Indicated (No Culture) - Urine, Clean Catch [[id number]]  (Abnormal) Collected: 04/22/25 0410    Specimen: Urine, Clean Catch Updated: 04/22/25 0420     Color, UA Yellow     Appearance, UA Clear     pH, UA <=5.0     Specific Gravity, UA >1.030     Glucose, UA >=1000 mg/dL (3+)     Ketones, UA Trace     Bilirubin, UA Negative     Blood, UA Negative     Protein, UA Trace     Leuk Esterase, UA Negative     Nitrite, UA Negative     Urobilinogen, UA 1.0  E.U./dL    Narrative:      Urine microscopic not indicated.             Imaging:    CT Abdomen Pelvis With Contrast  Result Date: 4/22/2025  CT ABDOMEN PELVIS W CONTRAST-  Date of exam: 4/22/2025 3:39 AM.  Comparisons: 1/1/2024; 5/2/2023; 7/24/2022.  INDICATIONS: 59-year-old female w/ abdominal pain/tenderness (especially on the left) & severe diarrhea.  TECHNIQUE: Axial CT images were obtained of the abdomen and pelvis following the uneventful intravenous administration of 80 mL (or less) of Isovue-370 contrast agent. Reconstructed 2D (two-dimensional) coronal and sagittal images were also obtained. Automated exposure control and iterative construction methods were used. Additionally, the radiation dose reduction device was turned on for each scan per the ALARA (As Low as Reasonably Achievable) protocol. No oral contrast agent was administered for the study. Please see the electronic medical record, EMR (i.e., Epic), for the documented dose of intravenous contrast agent as well as the radiation dose.  FINDINGS: Mildly dilated fluid-filled colon and to a lesser extent small bowel are seen. Uncomplicated infectious/inflammatory enterocolitis is suspected. Ischemic enterocolitis is thought to be less likely. No pneumoperitoneum or pneumatosis. No pericolonic or perienteric fluid collections are seen to suggest abscess. No portal or mesenteric venous gas. The mesenteric arteries are patent. No mechanical bowel obstruction. There may be a generalized adynamic ileus. No acute diverticulitis or appendicitis. Nonspecific small scattered mesenteric lymph nodes are seen. They may be reactive in nature. Otherwise, no acute findings are appreciated. Please see the prior exam reports for additional incidental/chronic findings.       Uncomplicated infectious/inflammatory enterocolitis is suspected by enhanced CT. Please see above comments for further detail.   Portions of this note were completed with a voice recognition  program.  4/22/2025 3:57 AM by Heriberto Schmid MD on Workstation: [id number]          Differential Diagnosis and Discussion:    Abdominal Pain: Based on the patient's signs and symptoms, I considered abdominal aortic aneurysm, small bowel obstruction, pancreatitis, acute cholecystitis, acute appendecitis, peptic ulcer disease, gastritis, colitis, endocrine disorders, irritable bowel syndrome and other differential diagnosis an etiology of the patient's abdominal pain.  Diarrhea: Differential diagnosis includes but is not limited to malabsorption syndrome, bacterial infection, carcinoid syndrome, pancreatic hypersecretion, viral infection, celiac sprue, Crohn's disease, ulcerative colitis, ischemic colitis, colitis, hypermotility, and irritable bowel syndrome.    PROCEDURES:    Labs were collected in the emergency department and all labs were reviewed and interpreted by me.  CT scan was performed in the emergency department and the CT scan radiology impression was interpreted by me.    No orders to display       Procedures    MDM  Number of Diagnoses or Management Options  Enterocolitis  Diagnosis management comments: The patient is resting comfortably and feels better, is alert and in no distress. Repeat examination is unremarkable and benign; in particular, there's no discomfort at McBurney's point and there is no pulsatile mass. The history, exam, diagnostic testing, and current condition does not suggest acute appendicitis, bowel obstruction, acute cholecystitis, bowel perforation, major gastrointestinal bleeding, severe diverticulitis, abdominal aortic aneurysm, mesenteric ischemia, volvulus, sepsis, or other significant pathology that warrants further testing, continued ED treatment, admission, for surgical evaluation at this point.  Patient was found to have enterocolitis which is likely viral in nature and will be treated with symptomatic medications.  The vital signs have been stable. The patient does not have  uncontrollable pain, intractable vomiting, or other significant symptoms. The patient's condition is stable and appropriate for discharge from the emergency department.       Amount and/or Complexity of Data Reviewed  Clinical lab tests: reviewed and ordered  Tests in the radiology section of CPT®: reviewed and ordered  Tests in the medicine section of CPT®: ordered and reviewed    Risk of Complications, Morbidity, and/or Mortality  Presenting problems: moderate  Diagnostic procedures: moderate  Management options: low    Patient Progress  Patient progress: stable             Patient Care Considerations:    ANTIBIOTICS: I considered prescribing antibiotics as an outpatient however no bacterial focus of infection was found.      Consultants/Shared Management Plan:    None    Social Determinants of Health:    Patient is independent, reliable, and has access to care.       Disposition and Care Coordination:    Discharged: I considered escalation of care by admitting this patient to the hospital, however no surgical emergencies or emergent medical problems were noted.  Patient will not be started on antibiotics as this may make the diarrhea worse.  Patient will be given symptomatic relief and have close follow-up with PCP    I have explained the patient´s condition, diagnoses and treatment plan based on the information available to me at this time. I have answered questions and addressed any concerns. The patient has a good  understanding of the patient´s diagnosis, condition, and treatment plan as can be expected at this point. The vital signs have been stable. The patient´s condition is stable and appropriate for discharge from the emergency department.      The patient will pursue further outpatient evaluation with the primary care physician or other designated or consulting physician as outlined in the discharge instructions. They are agreeable to this plan of care and follow-up instructions have been explained in  detail. The patient has received these instructions in written format and has expressed an understanding of the discharge instructions. The patient is aware that any significant change in condition or worsening of symptoms should prompt an immediate return to this or the closest emergency department or call to 911.  I have explained discharge medications and the need for follow up with the patient/caretakers. This was also printed in the discharge instructions. Patient was discharged with the following medications and follow up:      Medication List        New Prescriptions      loperamide 2 MG capsule  Commonly known as: IMODIUM  Take 1 capsule by mouth 4 (Four) Times a Day As Needed for Diarrhea.            Changed      dicyclomine 20 MG tablet  Commonly known as: BENTYL  Take 1 tablet by mouth Every 6 (Six) Hours As Needed for Abdominal Cramping.  What changed:   when to take this  reasons to take this               Where to Get Your Medications        These medications were sent to [facility]/pharmacy #[id number] - [place], [place] - [address] - [phone]  - [phone] FX  [address] [place] [address]      Hours: 24-hours Phone: [phone]   dicyclomine 20 MG tablet  loperamide 2 MG capsule      Carolynn Yanez, APRN  [address]  [address]  [place] [address]  [phone]    In 2 days  As needed       Final diagnoses:   Enterocolitis        ED Disposition       ED Disposition   Discharge    Condition   Stable    Comment   --               This medical record created using voice recognition software.             Brianna Francisco APRN  04/22/25 0447

## 2025-04-22 NOTE — DISCHARGE INSTRUCTIONS
Your CT does not show any signs of infection or condition that require surgical intervention.  There is just enterocolitis which is likely viral and causing diarrhea.    Take medications as prescribed for symptomatic relief.    Drink plenty fluids.    Follow-up with your PCP in 1 to 2 days for serial reexam.    Return for new or worsening symptoms

## 2025-04-24 ENCOUNTER — HOSPITAL ENCOUNTER (EMERGENCY)
Facility: HOSPITAL | Age: 60
Discharge: HOME | End: 2025-04-24
Attending: EMERGENCY MEDICINE
Payer: COMMERCIAL

## 2025-04-24 VITALS
DIASTOLIC BLOOD PRESSURE: 83 MMHG | WEIGHT: 139.11 LBS | BODY MASS INDEX: 25.6 KG/M2 | TEMPERATURE: 98.7 F | HEIGHT: 62 IN | RESPIRATION RATE: 16 BRPM | SYSTOLIC BLOOD PRESSURE: 134 MMHG | OXYGEN SATURATION: 96 % | HEART RATE: 75 BPM

## 2025-04-24 DIAGNOSIS — R10.32 LEFT LOWER QUADRANT ABDOMINAL PAIN: ICD-10-CM

## 2025-04-24 DIAGNOSIS — K52.9 COLITIS: Primary | ICD-10-CM

## 2025-04-24 DIAGNOSIS — R79.89 ELEVATED LFTS: ICD-10-CM

## 2025-04-24 DIAGNOSIS — N30.00 ACUTE CYSTITIS WITHOUT HEMATURIA: ICD-10-CM

## 2025-04-24 DIAGNOSIS — R19.7 DIARRHEA, UNSPECIFIED TYPE: ICD-10-CM

## 2025-04-24 LAB
027 TOXIN: NORMAL
ALBUMIN SERPL-MCNC: 3.7 G/DL (ref 3.5–5.2)
ALBUMIN/GLOB SERPL: 1.1 G/DL
ALP SERPL-CCNC: 287 U/L (ref 39–117)
ALT SERPL W P-5'-P-CCNC: 400 U/L (ref 1–33)
ANION GAP SERPL CALCULATED.3IONS-SCNC: 12.7 MMOL/L (ref 5–15)
AST SERPL-CCNC: 101 U/L (ref 1–32)
BACTERIA UR QL AUTO: ABNORMAL /HPF
BASOPHILS # BLD AUTO: 0.04 10*3/MM3 (ref 0–0.2)
BASOPHILS NFR BLD AUTO: 0.6 % (ref 0–1.5)
BILIRUB SERPL-MCNC: 0.3 MG/DL (ref 0–1.2)
BILIRUB UR QL STRIP: NEGATIVE
BUN SERPL-MCNC: 10 MG/DL (ref 6–20)
BUN/CREAT SERPL: 13.3 (ref 7–25)
C DIFF TOX GENS STL QL NAA+PROBE: NEGATIVE
CALCIUM SPEC-SCNC: 9.4 MG/DL (ref 8.6–10.5)
CHLORIDE SERPL-SCNC: 101 MMOL/L (ref 98–107)
CLARITY UR: CLEAR
CO2 SERPL-SCNC: 21.3 MMOL/L (ref 22–29)
COLOR UR: YELLOW
CREAT SERPL-MCNC: 0.75 MG/DL (ref 0.57–1)
D-LACTATE SERPL-SCNC: 2.4 MMOL/L (ref 0.5–2)
DEPRECATED RDW RBC AUTO: 41 FL (ref 37–54)
EGFRCR SERPLBLD CKD-EPI 2021: 91.8 ML/MIN/1.73
EOSINOPHIL # BLD AUTO: 0.09 10*3/MM3 (ref 0–0.4)
EOSINOPHIL NFR BLD AUTO: 1.3 % (ref 0.3–6.2)
ERYTHROCYTE [DISTWIDTH] IN BLOOD BY AUTOMATED COUNT: 12.8 % (ref 12.3–15.4)
FLUAV RNA RESP QL NAA+PROBE: NOT DETECTED
FLUBV RNA RESP QL NAA+PROBE: NOT DETECTED
GLOBULIN UR ELPH-MCNC: 3.5 GM/DL
GLUCOSE SERPL-MCNC: 232 MG/DL (ref 65–99)
GLUCOSE UR STRIP-MCNC: ABNORMAL MG/DL
HAV IGM SERPL QL IA: NORMAL
HBV CORE IGM SERPL QL IA: NORMAL
HBV SURFACE AG SERPL QL IA: NORMAL
HCT VFR BLD AUTO: 49.7 % (ref 34–46.6)
HCV AB SER QL: NORMAL
HGB BLD-MCNC: 16.4 G/DL (ref 12–15.9)
HGB UR QL STRIP.AUTO: ABNORMAL
HOLD SPECIMEN: NORMAL
HOLD SPECIMEN: NORMAL
HYALINE CASTS UR QL AUTO: ABNORMAL /LPF
IMM GRANULOCYTES # BLD AUTO: 0.03 10*3/MM3 (ref 0–0.05)
IMM GRANULOCYTES NFR BLD AUTO: 0.4 % (ref 0–0.5)
KETONES UR QL STRIP: NEGATIVE
LEUKOCYTE ESTERASE UR QL STRIP.AUTO: ABNORMAL
LIPASE SERPL-CCNC: 24 U/L (ref 13–60)
LYMPHOCYTES # BLD AUTO: 2.47 10*3/MM3 (ref 0.7–3.1)
LYMPHOCYTES NFR BLD AUTO: 34.6 % (ref 19.6–45.3)
MCH RBC QN AUTO: 28.9 PG (ref 26.6–33)
MCHC RBC AUTO-ENTMCNC: 33 G/DL (ref 31.5–35.7)
MCV RBC AUTO: 87.7 FL (ref 79–97)
MONOCYTES # BLD AUTO: 0.68 10*3/MM3 (ref 0.1–0.9)
MONOCYTES NFR BLD AUTO: 9.5 % (ref 5–12)
NEUTROPHILS NFR BLD AUTO: 3.83 10*3/MM3 (ref 1.7–7)
NEUTROPHILS NFR BLD AUTO: 53.6 % (ref 42.7–76)
NITRITE UR QL STRIP: NEGATIVE
NRBC BLD AUTO-RTO: 0 /100 WBC (ref 0–0.2)
PH UR STRIP.AUTO: 5.5 [PH] (ref 5–8)
PLATELET # BLD AUTO: 308 10*3/MM3 (ref 140–450)
PMV BLD AUTO: 10.7 FL (ref 6–12)
POTASSIUM SERPL-SCNC: 3.4 MMOL/L (ref 3.5–5.2)
PROT SERPL-MCNC: 7.2 G/DL (ref 6–8.5)
PROT UR QL STRIP: NEGATIVE
RBC # BLD AUTO: 5.67 10*6/MM3 (ref 3.77–5.28)
RBC # UR STRIP: ABNORMAL /HPF
REF LAB TEST METHOD: ABNORMAL
RSV RNA RESP QL NAA+PROBE: NOT DETECTED
SARS-COV-2 RNA RESP QL NAA+PROBE: NOT DETECTED
SODIUM SERPL-SCNC: 135 MMOL/L (ref 136–145)
SP GR UR STRIP: >1.03 (ref 1–1.03)
SQUAMOUS #/AREA URNS HPF: ABNORMAL /HPF
UROBILINOGEN UR QL STRIP: ABNORMAL
WBC # UR STRIP: ABNORMAL /HPF
WBC NRBC COR # BLD AUTO: 7.14 10*3/MM3 (ref 3.4–10.8)
WHOLE BLOOD HOLD COAG: NORMAL
WHOLE BLOOD HOLD SPECIMEN: NORMAL

## 2025-04-24 PROCEDURE — 96361 HYDRATE IV INFUSION ADD-ON: CPT

## 2025-04-24 PROCEDURE — 25010000002 CEFTRIAXONE PER 250 MG: Performed by: NURSE PRACTITIONER

## 2025-04-24 PROCEDURE — 96374 THER/PROPH/DIAG INJ IV PUSH: CPT

## 2025-04-24 PROCEDURE — 25010000002 ONDANSETRON PER 1 MG: Performed by: NURSE PRACTITIONER

## 2025-04-24 PROCEDURE — 87637 SARSCOV2&INF A&B&RSV AMP PRB: CPT | Performed by: EMERGENCY MEDICINE

## 2025-04-24 PROCEDURE — 87493 C DIFF AMPLIFIED PROBE: CPT | Performed by: EMERGENCY MEDICINE

## 2025-04-24 PROCEDURE — 87086 URINE CULTURE/COLONY COUNT: CPT | Performed by: NURSE PRACTITIONER

## 2025-04-24 PROCEDURE — 85025 COMPLETE CBC W/AUTO DIFF WBC: CPT | Performed by: EMERGENCY MEDICINE

## 2025-04-24 PROCEDURE — 25810000003 SODIUM CHLORIDE 0.9 % SOLUTION: Performed by: NURSE PRACTITIONER

## 2025-04-24 PROCEDURE — 81001 URINALYSIS AUTO W/SCOPE: CPT | Performed by: EMERGENCY MEDICINE

## 2025-04-24 PROCEDURE — 80053 COMPREHEN METABOLIC PANEL: CPT | Performed by: EMERGENCY MEDICINE

## 2025-04-24 PROCEDURE — 80074 ACUTE HEPATITIS PANEL: CPT | Performed by: NURSE PRACTITIONER

## 2025-04-24 PROCEDURE — 87505 NFCT AGENT DETECTION GI: CPT | Performed by: EMERGENCY MEDICINE

## 2025-04-24 PROCEDURE — 99283 EMERGENCY DEPT VISIT LOW MDM: CPT

## 2025-04-24 PROCEDURE — 96375 TX/PRO/DX INJ NEW DRUG ADDON: CPT

## 2025-04-24 PROCEDURE — 83690 ASSAY OF LIPASE: CPT | Performed by: EMERGENCY MEDICINE

## 2025-04-24 PROCEDURE — 83605 ASSAY OF LACTIC ACID: CPT | Performed by: EMERGENCY MEDICINE

## 2025-04-24 RX ORDER — DICYCLOMINE HCL 20 MG
40 TABLET ORAL EVERY 6 HOURS
Qty: 30 TABLET | Refills: 0 | Status: SHIPPED | OUTPATIENT
Start: 2025-04-24 | End: 2025-04-28

## 2025-04-24 RX ORDER — ONDANSETRON 2 MG/ML
4 INJECTION INTRAMUSCULAR; INTRAVENOUS ONCE
Status: COMPLETED | OUTPATIENT
Start: 2025-04-24 | End: 2025-04-24

## 2025-04-24 RX ORDER — ONDANSETRON 4 MG/1
4 TABLET, ORALLY DISINTEGRATING ORAL 4 TIMES DAILY PRN
Qty: 20 TABLET | Refills: 0 | Status: SHIPPED | OUTPATIENT
Start: 2025-04-24

## 2025-04-24 RX ORDER — SODIUM CHLORIDE 0.9 % (FLUSH) 0.9 %
10 SYRINGE (ML) INJECTION AS NEEDED
Status: DISCONTINUED | OUTPATIENT
Start: 2025-04-24 | End: 2025-04-24 | Stop reason: HOSPADM

## 2025-04-24 RX ADMIN — CEFTRIAXONE SODIUM 1000 MG: 1 INJECTION, POWDER, FOR SOLUTION INTRAMUSCULAR; INTRAVENOUS at 04:24

## 2025-04-24 RX ADMIN — ONDANSETRON 4 MG: 2 INJECTION INTRAMUSCULAR; INTRAVENOUS at 04:24

## 2025-04-24 RX ADMIN — SODIUM CHLORIDE 1000 ML: 9 INJECTION, SOLUTION INTRAVENOUS at 04:21

## 2025-04-24 NOTE — ED PROVIDER NOTES
Time: 3:11 AM EDT  Date of encounter:  2025  Independent Historian/Clinical History and Information was obtained by:   Patient    History is limited by: N/A    Chief Complaint: ABD PAIN/DIARRHEA      History of Present Illness:    The patient is a 59 y.o. year old female who presents to the emergency department for evaluation of left-sided abdominal pain.  The patient was seen in the emergency department on 2025 for the same symptoms.  She states that she was discharged without any information on why she was here.  She states that she does not know what her test showed and states that she is still having diarrhea.  The patient was treated for colitis.  She had a CT scan that was positive for mild colitis.  The patient is very upset and emotional.  She states she is concerned about her liver and pancreas.  She states that years ago she had something wrong with them and does not recall what it was but states that she is concerned that there may be something wrong with them now.  She states she has had chills but no fevers.  She reports nausea and vomiting on Saturday and once yesterday.  She denies any blood or mucus in her stools.  She states the pain is the same as it was when she was here previously.  She does have some mild tenderness with palpation.  She denies any back pain.  She denies any urinary symptoms..  The patient initially refused any interventions or further testing but then did agree to have some IV fluids and medications to treat her UTI      Patient Care Team  Primary Care Provider: Carolynn Yanez APRN    Past Medical History:     No Known Allergies  Past Medical History:   Diagnosis Date    Cancer     Diabetes mellitus     GERD (gastroesophageal reflux disease)     History of transfusion     Hypertension      Past Surgical History:   Procedure Laterality Date     SECTION      LAPAROSCOPIC TUBAL LIGATION       Family History   Problem Relation Age of Onset    Colon cancer Neg Hx         Home Medications:  Prior to Admission medications    Medication Sig Start Date End Date Taking? Authorizing Provider   albuterol sulfate  (90 Base) MCG/ACT inhaler Inhale 2 puffs Every 6 (Six) Hours As Needed for Shortness of Air or Wheezing. 12/14/22   Brianna Francisco APRN   azithromycin (Zithromax Z-Jamir) 250 MG tablet Take 2 tablets by mouth on day 1, then 1 tablet daily on days 2-5 12/14/22   Brianna Francisco APRN   benzonatate (TESSALON) 200 MG capsule Take 1 capsule by mouth 3 (Three) Times a Day As Needed for Cough. 12/14/22   Brianna Francisco APRN   colestipol (COLESTID) 1 g tablet  8/23/22   Princess Joaquin MD   dicyclomine (BENTYL) 20 MG tablet Take 1 tablet by mouth Every 6 (Six) Hours As Needed for Abdominal Cramping. 4/22/25   Biranna Francisco APRN   glipizide (Glucotrol) 5 MG tablet Take 1 tablet by mouth 2 (Two) Times a Day Before Meals for 30 days. 9/7/24 10/7/24  Saturnino Live PA   Invokana 300 MG tablet tablet  9/17/22   Princess Joaquin MD   levothyroxine (SYNTHROID, LEVOTHROID) 50 MCG tablet TAKE 1 TABLET BY MOUTH EVERY DAY FOR 90 DAYS 7/20/22   Princess Joaquin MD   lisinopril (PRINIVIL,ZESTRIL) 20 MG tablet TAKE 1 TABLET BY MOUTH EVERY DAY FOR 90 DAYS 7/26/22   Princess Joaquin MD   loperamide (IMODIUM) 2 MG capsule Take 1 capsule by mouth 4 (Four) Times a Day As Needed for Diarrhea. 4/22/25   Brianna Francisco APRN   methocarbamol (ROBAXIN) 750 MG tablet Take 2 tablets by mouth 3 (Three) Times a Day As Needed for Muscle Spasms. 8/16/24   Rush Pierre MD   ondansetron ODT (ZOFRAN-ODT) 4 MG disintegrating tablet Place 1 tablet on the tongue Every 8 (Eight) Hours As Needed for Nausea or Vomiting. 5/3/23   Danyell Springer MD   pantoprazole (PROTONIX) 40 MG EC tablet Take 1 tablet by mouth Daily. 9/21/22   Christi Marks, AMANDA   Sodium Sulfate-Mag Sulfate-KCl (Sutab) 9896-129-967 MG tablet Take 12 tablets by mouth Take As Directed. Take 12 tablets at 6 pm and 12  "tablets 4 hours prior to procedure. 9/21/22   Christi Marks, AMANDA   Trulicity 4.5 MG/0.5ML solution pen-injector  8/24/22   Provider, MD Princess        Social History:   Social History     Tobacco Use    Smoking status: Never    Smokeless tobacco: Never   Vaping Use    Vaping status: Never Used   Substance Use Topics    Alcohol use: Never    Drug use: Never         Review of Systems:  Review of Systems   Constitutional:  Negative for chills and fever.   HENT:  Negative for congestion, ear pain and sore throat.    Eyes:  Negative for pain.   Respiratory:  Negative for cough, chest tightness, shortness of breath and wheezing.    Cardiovascular:  Negative for chest pain.   Gastrointestinal:  Positive for abdominal pain, diarrhea and nausea. Negative for vomiting.   Genitourinary:  Negative for dysuria, flank pain, frequency, hematuria and urgency.   Musculoskeletal:  Negative for back pain, joint swelling, neck pain and neck stiffness.   Skin:  Negative for pallor and rash.   Neurological:  Negative for seizures and headaches.   All other systems reviewed and are negative.       Physical Exam:  /86 (BP Location: Right arm, Patient Position: Lying)   Pulse 76   Temp 98.5 °F (36.9 °C) (Oral)   Resp 16   Ht 157.5 cm (62\")   Wt 63.1 kg (139 lb 1.8 oz)   SpO2 96%   BMI 25.44 kg/m²     Physical Exam  Vitals and nursing note reviewed.   Constitutional:       General: She is not in acute distress.     Appearance: Normal appearance. She is not ill-appearing or toxic-appearing.   HENT:      Head: Normocephalic and atraumatic.   Eyes:      General: No scleral icterus.     Conjunctiva/sclera: Conjunctivae normal.      Pupils: Pupils are equal, round, and reactive to light.   Cardiovascular:      Rate and Rhythm: Normal rate and regular rhythm.      Pulses: Normal pulses.   Pulmonary:      Effort: Pulmonary effort is normal. No respiratory distress.      Breath sounds: Normal breath sounds. No wheezing. "   Abdominal:      General: Abdomen is flat. There is no distension.      Palpations: Abdomen is soft.      Tenderness: There is abdominal tenderness. There is no right CVA tenderness, left CVA tenderness, guarding or rebound.   Musculoskeletal:         General: Normal range of motion.      Cervical back: Normal range of motion.   Skin:     General: Skin is warm and dry.      Capillary Refill: Capillary refill takes less than 2 seconds.      Findings: No rash.   Neurological:      General: No focal deficit present.      Mental Status: She is alert and oriented to person, place, and time. Mental status is at baseline.   Psychiatric:         Mood and Affect: Affect is angry and tearful.        Medical Decision Making:      Comorbidities that affect care:    GERD, history of transfusion, hypertension, diabetes, cancer    External Notes reviewed:    Previous ED Note: Was seen in the emergency department on 4/22/2025 for similar symptoms and diagnosed with enterocolitis.      The following orders were placed and all results were independently analyzed by me:  Orders Placed This Encounter   Procedures    COVID-19, FLU A/B, RSV PCR 1 HR TAT - Swab, Nasopharynx    Enteric Bacterial Panel - Stool, Per Rectum    Clostridioides difficile Toxin - Stool, Per Rectum    Clostridioides difficile Toxin, PCR - Stool, Per Rectum    Urine Culture - Urine,    [place] Draw    Comprehensive Metabolic Panel    Lipase    Urinalysis With Microscopic If Indicated (No Culture) - Urine, Clean Catch    Lactic Acid, Plasma    CBC Auto Differential    Urinalysis, Microscopic Only - Urine, Clean Catch    STAT Lactic Acid, Reflex    Hepatitis Panel, Acute    Ambulatory Referral to Gastroenterology    NPO Diet NPO Type: Strict NPO    Undress & Gown    Patient Isolation Contact Spore    Insert Peripheral IV    CBC & Differential    Green Top (Gel)    Lavender Top    Gold Top - SST    Light Blue Top       Medications Given in the Emergency  Department:  Medications   sodium chloride 0.9 % flush 10 mL (has no administration in time range)   sodium chloride 0.9 % bolus 1,000 mL (1,000 mL Intravenous New Bag 4/24/25 0421)   cefTRIAXone (ROCEPHIN) in NS 1 gram/10ml IV PUSH syringe (1,000 mg Intravenous Given 4/24/25 0424)   ondansetron (ZOFRAN) injection 4 mg (4 mg Intravenous Given 4/24/25 0424)        ED Course:    ED Course as of 04/24/25 0609   Thu Apr 24, 2025   0312 Narrative & Impression  CT ABDOMEN PELVIS W CONTRAST-     Date of exam: 4/22/2025 3:39 AM.     Comparisons: 1/1/2024; 5/2/2023; 7/24/2022.     INDICATIONS:   59-year-old female w/ abdominal pain/tenderness (especially on the left)  & severe diarrhea.     TECHNIQUE:  Axial CT images were obtained of the abdomen and pelvis following the  uneventful intravenous administration of 80 mL (or less) of Isovue-370  contrast agent. Reconstructed 2D (two-dimensional) coronal and sagittal  images were also obtained. Automated exposure control and iterative  construction methods were used. Additionally, the radiation dose  reduction device was turned on for each scan per the ALARA (As Low as  Reasonably Achievable) protocol. No oral contrast agent was administered  for the study. Please see the electronic medical record, EMR (i.e.,  Epic), for the documented dose of intravenous contrast agent as well as  the radiation dose.     FINDINGS:  Mildly dilated fluid-filled colon and to a lesser extent small bowel are  seen. Uncomplicated infectious/inflammatory enterocolitis is suspected.  Ischemic enterocolitis is thought to be less likely. No pneumoperitoneum  or pneumatosis. No pericolonic or perienteric fluid collections are seen  to suggest abscess. No portal or mesenteric venous gas. The mesenteric  arteries are patent. No mechanical bowel obstruction. There may be a  generalized adynamic ileus. No acute diverticulitis or appendicitis.  Nonspecific small scattered mesenteric lymph nodes are seen. They  may be  reactive in nature. Otherwise, no acute findings are appreciated. Please  see the prior exam reports for additional incidental/chronic findings.        IMPRESSION:  Uncomplicated infectious/inflammatory enterocolitis is suspected by  enhanced CT. Please see above comments for further detail.         Portions of this note were completed with a voice recognition program.     4/22/2025 3:57 AM by Heriberto Schmid MD on Workstation: [email or web address]      [TC]   0646 I did review the patient's previous liver enzymes and have not seen any that were elevated significantly.  The patient was unable to tell me at what point her previous liver enzymes had been elevated and states that she is not sure what they had diagnosed her with.  I did place a referral for GI to follow-up with the patient concerning this and discussed with her the need to follow-up with them so that they may ensure that liver enzymes are normal there is no further workup needed. [TC]      ED Course User Index  [TC] Cherelle Porter APRN       Labs:    Lab Results (last 24 hours)       Procedure Component Value Units Date/Time    COVID-19, FLU A/B, RSV PCR 1 HR TAT - Swab, Nasopharynx [[id number]]  (Normal) Collected: 04/24/25 0231    Specimen: Swab from Nasopharynx Updated: 04/24/25 0313     COVID19 Not Detected     Influenza A PCR Not Detected     Influenza B PCR Not Detected     RSV, PCR Not Detected    Narrative:      Fact sheet for providers: https://www.fda.gov/media/347070/download    Fact sheet for patients: https://www.fda.gov/media/631936/download    Test performed by PCR.    CBC & Differential [[id number]]  (Abnormal) Collected: 04/24/25 0257    Specimen: Blood Updated: 04/24/25 0305    Narrative:      The following orders were created for panel order CBC & Differential.  Procedure                               Abnormality         Status                     ---------                               -----------         ------                     CBC Auto  Differential[[id number]]        Abnormal            Final result                 Please view results for these tests on the individual orders.    Comprehensive Metabolic Panel [[id number]]  (Abnormal) Collected: 04/24/25 0257    Specimen: Blood Updated: 04/24/25 0345     Glucose 232 mg/dL      BUN 10 mg/dL      Creatinine 0.75 mg/dL      Sodium 135 mmol/L      Potassium 3.4 mmol/L      Chloride 101 mmol/L      CO2 21.3 mmol/L      Calcium 9.4 mg/dL      Total Protein 7.2 g/dL      Albumin 3.7 g/dL      ALT (SGPT) 400 U/L      AST (SGOT) 101 U/L      Alkaline Phosphatase 287 U/L      Total Bilirubin 0.3 mg/dL      Globulin 3.5 gm/dL      A/G Ratio 1.1 g/dL      BUN/Creatinine Ratio 13.3     Anion Gap 12.7 mmol/L      eGFR 91.8 mL/min/1.73     Narrative:      GFR Categories in Chronic Kidney Disease (CKD)      GFR Category          GFR (mL/min/1.73)    Interpretation  G1                     90 or greater         Normal or high (1)  G2                      60-89                Mild decrease (1)  G3a                   45-59                Mild to moderate decrease  G3b                   30-44                Moderate to severe decrease  G4                    15-29                Severe decrease  G5                    14 or less           Kidney failure          (1)In the absence of evidence of kidney disease, neither GFR category G1 or G2 fulfill the criteria for CKD.    eGFR calculation 2021 CKD-EPI creatinine equation, which does not include race as a factor    Lipase [[id number]]  (Normal) Collected: 04/24/25 0257    Specimen: Blood Updated: 04/24/25 0331     Lipase 24 U/L     Lactic Acid, Plasma [[id number]]  (Abnormal) Collected: 04/24/25 0257    Specimen: Blood Updated: 04/24/25 0357     Lactate 2.4 mmol/L     CBC Auto Differential [[id number]]  (Abnormal) Collected: 04/24/25 0257    Specimen: Blood Updated: 04/24/25 0305     WBC 7.14 10*3/mm3      RBC 5.67 10*6/mm3      Hemoglobin 16.4 g/dL      Hematocrit 49.7 %       MCV 87.7 fL      MCH 28.9 pg      MCHC 33.0 g/dL      RDW 12.8 %      RDW-SD 41.0 fl      MPV 10.7 fL      Platelets 308 10*3/mm3      Neutrophil % 53.6 %      Lymphocyte % 34.6 %      Monocyte % 9.5 %      Eosinophil % 1.3 %      Basophil % 0.6 %      Immature Grans % 0.4 %      Neutrophils, Absolute 3.83 10*3/mm3      Lymphocytes, Absolute 2.47 10*3/mm3      Monocytes, Absolute 0.68 10*3/mm3      Eosinophils, Absolute 0.09 10*3/mm3      Basophils, Absolute 0.04 10*3/mm3      Immature Grans, Absolute 0.03 10*3/mm3      nRBC 0.0 /100 WBC     Urinalysis With Microscopic If Indicated (No Culture) - Urine, Clean Catch [[id number]]  (Abnormal) Collected: 04/24/25 0306    Specimen: Urine, Clean Catch Updated: 04/24/25 0323     Color, UA Yellow     Appearance, UA Clear     pH, UA 5.5     Specific Gravity, UA >1.030     Glucose, UA >=1000 mg/dL (3+)     Ketones, UA Negative     Bilirubin, UA Negative     Blood, UA Trace     Protein, UA Negative     Leuk Esterase, UA Small (1+)     Nitrite, UA Negative     Urobilinogen, UA 1.0 E.U./dL    Urinalysis, Microscopic Only - Urine, Clean Catch [[id number]]  (Abnormal) Collected: 04/24/25 0306    Specimen: Urine, Clean Catch Updated: 04/24/25 0326     RBC, UA 0-2 /HPF      WBC, UA Too Numerous to Count /HPF      Bacteria, UA 1+ /HPF      Squamous Epithelial Cells, UA 3-6 /HPF      Hyaline Casts, UA 3-6 /LPF      Methodology Automated Microscopy    Urine Culture - Urine, Urine, Clean Catch [[id number]] Collected: 04/24/25 0306    Specimen: Urine, Clean Catch Updated: 04/24/25 0404    Hepatitis Panel, Acute [[id number]] Collected: 04/24/25 0422    Specimen: Blood from Arm, Right Updated: 04/24/25 0425             Imaging:    No Radiology Exams Resulted Within Past 24 Hours      Differential Diagnosis and Discussion:    Abdominal Pain: Based on the patient's signs and symptoms, I considered abdominal aortic aneurysm, small bowel obstruction, pancreatitis, acute cholecystitis, acute  appendecitis, peptic ulcer disease, gastritis, colitis, endocrine disorders, irritable bowel syndrome and other differential diagnosis an etiology of the patient's abdominal pain.  Diarrhea: Differential diagnosis includes but is not limited to malabsorption syndrome, bacterial infection, carcinoid syndrome, pancreatic hypersecretion, viral infection, celiac sprue, Crohn's disease, ulcerative colitis, ischemic colitis, colitis, hypermotility, and irritable bowel syndrome.    PROCEDURES:    Labs were collected in the emergency department and all labs were reviewed and interpreted by me.    No orders to display       Procedures    MDM  Number of Diagnoses or Management Options  Acute cystitis without hematuria: new and requires workup  Colitis: established and worsening  Diarrhea, unspecified type: established and worsening  Elevated LFTs: new and requires workup  Left lower quadrant abdominal pain: established and worsening     Amount and/or Complexity of Data Reviewed  Clinical lab tests: reviewed  Decide to obtain previous medical records or to obtain history from someone other than the patient: yes    Risk of Complications, Morbidity, and/or Mortality  Presenting problems: low  Diagnostic procedures: low  Management options: low    Patient Progress  Patient progress: stable       Patient Care Considerations:    CT ABDOMEN AND PELVIS: I considered ordering a CT scan of the abdomen and pelvis however considering the patient had just had a CT scan within 24 hours it did show a colitis.      Consultants/Shared Management Plan:    I have discussed the case with DR YE who states that the patient can be safely discharged with close follow up.    Social Determinants of Health:    Patient is independent, reliable, and has access to care.       Disposition and Care Coordination:    Discharged: The patient is suitable and stable for discharge with no need for consideration of admission.    I have explained the patient´s  condition, diagnoses and treatment plan based on the information available to me at this time. I have answered questions and addressed any concerns. The patient has a good  understanding of the patient´s diagnosis, condition, and treatment plan as can be expected at this point. The vital signs have been stable. The patient´s condition is stable and appropriate for discharge from the emergency department.      The patient will pursue further outpatient evaluation with the primary care physician or other designated or consulting physician as outlined in the discharge instructions. They are agreeable to this plan of care and follow-up instructions have been explained in detail. The patient has received these instructions in written format and has expressed an understanding of the discharge instructions. The patient is aware that any significant change in condition or worsening of symptoms should prompt an immediate return to this or the closest emergency department or call to 911.  I have explained discharge medications and the need for follow up with the patient/caretakers. This was also printed in the discharge instructions. Patient was discharged with the following medications and follow up:      Medication List        New Prescriptions      amoxicillin-clavulanate 875-125 MG per tablet  Commonly known as: AUGMENTIN  Take 1 tablet by mouth 2 (Two) Times a Day for 10 days.     ondansetron ODT 4 MG disintegrating tablet  Commonly known as: ZOFRAN-ODT  Place 1 tablet on the tongue 4 (Four) Times a Day As Needed for Nausea or Vomiting.            Changed      * dicyclomine 20 MG tablet  Commonly known as: BENTYL  Take 1 tablet by mouth Every 6 (Six) Hours As Needed for Abdominal Cramping.  What changed: Another medication with the same name was added. Make sure you understand how and when to take each.     * dicyclomine 20 MG tablet  Commonly known as: BENTYL  Take 2 tablets by mouth Every 6 (Six) Hours for 15  doses.  What changed: You were already taking a medication with the same name, and this prescription was added. Make sure you understand how and when to take each.           * This list has 2 medication(s) that are the same as other medications prescribed for you. Read the directions carefully, and ask your doctor or other care provider to review them with you.                   Where to Get Your Medications        These medications were sent to [facility]/pharmacy #[id number] - [place], [place] - [address] - [phone] [facility] [phone]   [address] [place] [address]      Hours: 24-hours Phone: [phone]   amoxicillin-clavulanate 875-125 MG per tablet  dicyclomine 20 MG tablet  ondansetron ODT 4 MG disintegrating tablet      Adali Alonso, APRN  [address] [facility] [address]  [phone]          Carolynn Yanez, APRN  [address] [facility]  [address]  [facility] [address]  [phone]    Call today  FOR FOLLOW UP       Final diagnoses:   Colitis   Diarrhea, unspecified type   Acute cystitis without hematuria   Left lower quadrant abdominal pain   Elevated LFTs        ED Disposition       ED Disposition   Discharge    Condition   Stable    Comment   --               This medical record created using voice recognition software.             Cherelle Porter APRN  04/24/25 [id number]

## 2025-04-24 NOTE — DISCHARGE INSTRUCTIONS
Your test today did show that you have a urinary tract infection for which we did give you a dose of antibiotics in the emergency department and I am sending antibiotics to the pharmacy for you to take.  The antibiotics that were placing you on will cover the urinary tract infection and the colitis that you were previously diagnosed with.  I am also sending you some stomach medication that will help with abdominal cramps and some nausea medication to the pharmacy as well.  Drink plenty of fluids over the next several days.  You may take some probiotics over-the-counter or increase your daily dietary yogurt intake to help with your diarrhea symptoms.  Continue with your previously prescribed medications as written.  I did place a referral for GI to call you and get you set up with them to look into why your liver enzymes were elevated.  I will also send off some laboratory work that they can review with you at that time.  Call your primary care today and set up a visit with them to reevaluate your symptoms to ensure that your diarrhea is improving and your abdominal pain is getting better.  They may want to look at your liver enzymes again with repeat lab work as well.  Return to the emergency department immediately for any acutely worsening and persistent abdominal pain, any persistent vomiting, any fevers of 101 or greater or any new or worse concerns.

## 2025-04-24 NOTE — ED PROVIDER NOTES
"Patient is 59 y.o. year old female that presents to the ED for evaluation of diarrhea.       Physical Exam    ED Course:    /83 (BP Location: Right arm, Patient Position: Lying)   Pulse 75   Temp 98.7 °F (37.1 °C) (Oral)   Resp 16   Ht 157.5 cm (62\")   Wt 63.1 kg (139 lb 1.8 oz)   SpO2 96%   BMI 25.44 kg/m²   Results for orders placed or performed during the hospital encounter of 04/24/25   COVID-19, FLU A/B, RSV PCR 1 HR TAT - Swab, Nasopharynx    Collection Time: 04/24/25  2:31 AM    Specimen: Nasopharynx; Swab   Result Value Ref Range    COVID19 Not Detected Not Detected - Ref. Range    Influenza A PCR Not Detected Not Detected    Influenza B PCR Not Detected Not Detected    RSV, PCR Not Detected Not Detected   Comprehensive Metabolic Panel    Collection Time: 04/24/25  2:57 AM    Specimen: Blood   Result Value Ref Range    Glucose 232 (H) 65 - 99 mg/dL    BUN 10 6 - 20 mg/dL    Creatinine 0.75 0.57 - 1.00 mg/dL    Sodium 135 (L) 136 - 145 mmol/L    Potassium 3.4 (L) 3.5 - 5.2 mmol/L    Chloride 101 98 - 107 mmol/L    CO2 21.3 (L) 22.0 - 29.0 mmol/L    Calcium 9.4 8.6 - 10.5 mg/dL    Total Protein 7.2 6.0 - 8.5 g/dL    Albumin 3.7 3.5 - 5.2 g/dL    ALT (SGPT) 400 (H) 1 - 33 U/L    AST (SGOT) 101 (H) 1 - 32 U/L    Alkaline Phosphatase 287 (H) 39 - 117 U/L    Total Bilirubin 0.3 0.0 - 1.2 mg/dL    Globulin 3.5 gm/dL    A/G Ratio 1.1 g/dL    BUN/Creatinine Ratio 13.3 7.0 - 25.0    Anion Gap 12.7 5.0 - 15.0 mmol/L    eGFR 91.8 >60.0 mL/min/1.73   Lipase    Collection Time: 04/24/25  2:57 AM    Specimen: Blood   Result Value Ref Range    Lipase 24 13 - 60 U/L   Lactic Acid, Plasma    Collection Time: 04/24/25  2:57 AM    Specimen: Blood   Result Value Ref Range    Lactate 2.4 (C) 0.5 - 2.0 mmol/L   CBC Auto Differential    Collection Time: 04/24/25  2:57 AM    Specimen: Blood   Result Value Ref Range    WBC 7.14 3.40 - 10.80 10*3/mm3    RBC 5.67 (H) 3.77 - 5.28 10*6/mm3    Hemoglobin 16.4 (H) 12.0 - 15.9 " g/dL    Hematocrit 49.7 (H) 34.0 - 46.6 %    MCV 87.7 79.0 - 97.0 fL    MCH 28.9 26.6 - 33.0 pg    MCHC 33.0 31.5 - 35.7 g/dL    RDW 12.8 12.3 - 15.4 %    RDW-SD 41.0 37.0 - 54.0 fl    MPV 10.7 6.0 - 12.0 fL    Platelets 308 140 - 450 10*3/mm3    Neutrophil % 53.6 42.7 - 76.0 %    Lymphocyte % 34.6 19.6 - 45.3 %    Monocyte % 9.5 5.0 - 12.0 %    Eosinophil % 1.3 0.3 - 6.2 %    Basophil % 0.6 0.0 - 1.5 %    Immature Grans % 0.4 0.0 - 0.5 %    Neutrophils, Absolute 3.83 1.70 - 7.00 10*3/mm3    Lymphocytes, Absolute 2.47 0.70 - 3.10 10*3/mm3    Monocytes, Absolute 0.68 0.10 - 0.90 10*3/mm3    Eosinophils, Absolute 0.09 0.00 - 0.40 10*3/mm3    Basophils, Absolute 0.04 0.00 - 0.20 10*3/mm3    Immature Grans, Absolute 0.03 0.00 - 0.05 10*3/mm3    nRBC 0.0 0.0 - 0.2 /100 WBC   Green Top (Gel)    Collection Time: 04/24/25  2:57 AM   Result Value Ref Range    Extra Tube Hold for add-ons.    Lavender Top    Collection Time: 04/24/25  2:57 AM   Result Value Ref Range    Extra Tube hold for add-on    Gold Top - SST    Collection Time: 04/24/25  2:57 AM   Result Value Ref Range    Extra Tube Hold for add-ons.    Light Blue Top    Collection Time: 04/24/25  2:57 AM   Result Value Ref Range    Extra Tube Hold for add-ons.    Urinalysis With Microscopic If Indicated (No Culture) - Urine, Clean Catch    Collection Time: 04/24/25  3:06 AM    Specimen: Urine, Clean Catch   Result Value Ref Range    Color, UA Yellow Yellow, Straw    Appearance, UA Clear Clear    pH, UA 5.5 5.0 - 8.0    Specific Gravity, UA >1.030 (H) 1.005 - 1.030    Glucose, UA >=1000 mg/dL (3+) (A) Negative    Ketones, UA Negative Negative    Bilirubin, UA Negative Negative    Blood, UA Trace (A) Negative    Protein, UA Negative Negative    Leuk Esterase, UA Small (1+) (A) Negative    Nitrite, UA Negative Negative    Urobilinogen, UA 1.0 E.U./dL 0.2 - 1.0 E.U./dL   Urinalysis, Microscopic Only - Urine, Clean Catch    Collection Time: 04/24/25  3:06 AM    Specimen:  Urine, Clean Catch   Result Value Ref Range    RBC, UA 0-2 None Seen, 0-2 /HPF    WBC, UA Too Numerous to Count (A) None Seen, 0-2 /HPF    Bacteria, UA 1+ (A) None Seen /HPF    Squamous Epithelial Cells, UA 3-6 (A) None Seen, 0-2 /HPF    Hyaline Casts, UA 3-6 None Seen /LPF    Methodology Automated Microscopy      Medications   sodium chloride 0.9 % flush 10 mL (has no administration in time range)   sodium chloride 0.9 % bolus 1,000 mL (0 mL Intravenous Stopped 4/24/25 0624)   cefTRIAXone (ROCEPHIN) in NS 1 gram/10ml IV PUSH syringe (1,000 mg Intravenous Given 4/24/25 0424)   ondansetron (ZOFRAN) injection 4 mg (4 mg Intravenous Given 4/24/25 0424)     CT Abdomen Pelvis With Contrast  Result Date: 4/22/2025  Narrative: CT ABDOMEN PELVIS W CONTRAST-  Date of exam: 4/22/2025 3:39 AM.  Comparisons: 1/1/2024; 5/2/2023; 7/24/2022.  INDICATIONS: 59-year-old female w/ abdominal pain/tenderness (especially on the left) & severe diarrhea.  TECHNIQUE: Axial CT images were obtained of the abdomen and pelvis following the uneventful intravenous administration of 80 mL (or less) of Isovue-370 contrast agent. Reconstructed 2D (two-dimensional) coronal and sagittal images were also obtained. Automated exposure control and iterative construction methods were used. Additionally, the radiation dose reduction device was turned on for each scan per the ALARA (As Low as Reasonably Achievable) protocol. No oral contrast agent was administered for the study. Please see the electronic medical record, EMR (i.e., Epic), for the documented dose of intravenous contrast agent as well as the radiation dose.  FINDINGS: Mildly dilated fluid-filled colon and to a lesser extent small bowel are seen. Uncomplicated infectious/inflammatory enterocolitis is suspected. Ischemic enterocolitis is thought to be less likely. No pneumoperitoneum or pneumatosis. No pericolonic or perienteric fluid collections are seen to suggest abscess. No portal or  mesenteric venous gas. The mesenteric arteries are patent. No mechanical bowel obstruction. There may be a generalized adynamic ileus. No acute diverticulitis or appendicitis. Nonspecific small scattered mesenteric lymph nodes are seen. They may be reactive in nature. Otherwise, no acute findings are appreciated. Please see the prior exam reports for additional incidental/chronic findings.       Impression: Uncomplicated infectious/inflammatory enterocolitis is suspected by enhanced CT. Please see above comments for further detail.   Portions of this note were completed with a voice recognition program.  4/22/2025 3:57 AM by Heriberto Schmid MD on Workstation: [email or web address]        MDM:      I have seen and evaluated this patient and agree with the nurse practitioner or physician assistant´s documentation and assessment. All charts, labs, and imaging studies were reviewed. Documentation of one or more elements of my assessment included in the medical record. I agree with findings, exam, and plan.    Disposition:   ED Disposition       ED Disposition   Discharge    Condition   Stable    Comment   --                 Clincal Impression: Colitis, possible UTI    Solomon Schumacher MD  06:59 EDT  04/24/25         Solomon Schumacher MD  04/24/25 0659

## 2025-04-24 NOTE — Clinical Note
[facility] EMERGENCY ROOM  [address] [place] [address]  Phone: [phone]  Fax: [phone]    Rossy Valdivia was seen and treated in our emergency department on 4/24/2025.  She may return to work on 04/25/2025.         Thank you for choosing [facility].    Cherelle Porter APRN

## 2025-04-25 LAB
BACTERIA SPEC AEROBE CULT: NORMAL
C COLI+JEJ+UPSA DNA STL QL NAA+NON-PROBE: NOT DETECTED
EC STX1+STX2 GENES STL QL NAA+NON-PROBE: NOT DETECTED
S ENT+BONG DNA STL QL NAA+NON-PROBE: NOT DETECTED
SHIGELLA SP+EIEC IPAH ST NAA+NON-PROBE: NOT DETECTED

## 2025-04-30 ENCOUNTER — TELEPHONE (OUTPATIENT)
Dept: GASTROENTEROLOGY | Facility: CLINIC | Age: 60
End: 2025-04-30
Payer: COMMERCIAL

## 2025-04-30 NOTE — TELEPHONE ENCOUNTER
Contacted patient about this urgent referral. Scheduled in a new patient slot with Arely pablo MA, scheduled for 05/28/2025 at 0330 pm. To follow up from this ED visit.

## 2025-05-28 ENCOUNTER — OFFICE VISIT (OUTPATIENT)
Dept: GASTROENTEROLOGY | Facility: CLINIC | Age: 60
End: 2025-05-28
Payer: COMMERCIAL

## 2025-05-28 VITALS
SYSTOLIC BLOOD PRESSURE: 117 MMHG | HEART RATE: 80 BPM | WEIGHT: 139.6 LBS | DIASTOLIC BLOOD PRESSURE: 64 MMHG | BODY MASS INDEX: 25.69 KG/M2 | OXYGEN SATURATION: 97 % | HEIGHT: 62 IN

## 2025-05-28 DIAGNOSIS — R12 HEARTBURN: ICD-10-CM

## 2025-05-28 DIAGNOSIS — K86.89 PANCREATIC INSUFFICIENCY: ICD-10-CM

## 2025-05-28 DIAGNOSIS — R19.7 DIARRHEA, UNSPECIFIED TYPE: Primary | ICD-10-CM

## 2025-05-28 RX ORDER — SODIUM, POTASSIUM,MAG SULFATES 17.5-3.13G
2 SOLUTION, RECONSTITUTED, ORAL ORAL TAKE AS DIRECTED
Qty: 177 ML | Refills: 0 | Status: SHIPPED | OUTPATIENT
Start: 2025-05-28

## 2025-05-28 RX ORDER — EMPAGLIFLOZIN 25 MG/1
TABLET, FILM COATED ORAL
COMMUNITY
Start: 2025-03-19

## 2025-05-28 RX ORDER — FAMOTIDINE 40 MG/1
40 TABLET, FILM COATED ORAL
Qty: 90 TABLET | Refills: 1 | Status: SHIPPED | OUTPATIENT
Start: 2025-05-28

## 2025-05-28 NOTE — PROGRESS NOTES
Chief Complaint     GI Problem (Pt states her stomach is getting worse. Diarrhea and stomach pain during bowel movements)    Patient or patient representative verbalized consent for the use of Ambient Listening during the visit with  AMANDA Chapa for chart documentation. 5/28/2025  14:46 EDT      History of Present Illness     History of Present Illness  The patient presents for an ER follow-up.    She was last evaluated in the office on 09/21/2022, at which time she reported diarrhea that had been present for greater than 20 years. Stool studies were ordered for further workup and completed on 10/03/2022. The results were as follows: C. difficile negative, enteric bacterial and parasite panel negative, occult blood negative, fecal lactoferrin negative, and pancreatic elastase less than 50, consistent with severe pancreatic insufficiency. Stool studies on 04/22/2025 were also negative for bacteria and C. difficile.    She continues to experience intermittent diarrhea, which has been a persistent issue for over 20 years. The frequency of her bowel movements varies, with some meals resulting in diarrhea while others do not. She does not experience any associated abdominal pain. Her dietary habits include consuming food twice daily, once in the morning and once at night, with no intake during the day. She does not recall if she was prescribed pancreatic enzymes such as Creon or Zenpep during her previous visit.    She also reports chronic acid reflux, which is more pronounced at night. Despite being on pantoprazole 40 mg for over 30 years, she has noticed an increase in the frequency of her symptoms recently. An upper endoscopy was performed several years ago.    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  Diet: Eats twice a day, once in the morning and once at night.     History      Past Medical History:   Diagnosis Date    Cancer      Chronic kidney disease Kidney Stones    Diabetes mellitus     GERD (gastroesophageal reflux disease)     History of transfusion     Hypertension     Irritable bowel syndrome     Lactose intolerance      Past Surgical History:   Procedure Laterality Date     SECTION      CHOLECYSTECTOMY      LAPAROSCOPIC TUBAL LIGATION      TUBAL ABDOMINAL LIGATION       Family History   Problem Relation Age of Onset    Colon cancer Neg Hx         Current Medications       Current Outpatient Medications:     glipizide (Glucotrol) 5 MG tablet, Take 1 tablet by mouth 2 (Two) Times a Day Before Meals for 30 days., Disp: 60 tablet, Rfl: 0    Jardiance 25 MG tablet tablet, 1 tab(s) Orally once a day (in the morning) for 90 days, Disp: , Rfl:     levothyroxine (SYNTHROID, LEVOTHROID) 50 MCG tablet, TAKE 1 TABLET BY MOUTH EVERY DAY FOR 90 DAYS, Disp: , Rfl:     lisinopril (PRINIVIL,ZESTRIL) 20 MG tablet, TAKE 1 TABLET BY MOUTH EVERY DAY FOR 90 DAYS, Disp: , Rfl:     loperamide (IMODIUM) 2 MG capsule, Take 1 capsule by mouth 4 (Four) Times a Day As Needed for Diarrhea., Disp: 20 capsule, Rfl: 0    pantoprazole (PROTONIX) 40 MG EC tablet, Take 1 tablet by mouth Daily., Disp: 90 tablet, Rfl: 1    Trulicity 4.5 MG/0.5ML solution pen-injector, , Disp: , Rfl:     famotidine (Pepcid) 40 MG tablet, Take 1 tablet by mouth every night at bedtime., Disp: 90 tablet, Rfl: 1    Pancrelipase, Lip-Prot-Amyl, (Creon) 92755-990855 units capsule delayed-release particles capsule, Take 2 capsules by mouth 3 (Three) Times a Day With Meals for 90 days. Take 2 with first bite of food.  1 with snacks, Disp: 540 capsule, Rfl: 1    Pancrelipase, Lip-Prot-Amyl, (Creon) 55177-676847 units capsule delayed-release particles capsule, Take 1 capsule by mouth 3 (Three) Times a Day With Meals., Disp: 48 capsule, Rfl: 0    sodium-potassium-magnesium sulfates (Suprep Bowel Prep Kit) 17.5-3.13-1.6 GM/177ML solution oral solution, Take 2 bottles by  "mouth Take As Directed., Disp: 177 mL, Rfl: 0     Allergies     No Known Allergies    Social History       Social History     Social History Narrative    Not on file         Objective       /64   Pulse 80   Ht 157.5 cm (62\")   Wt 63.3 kg (139 lb 9.6 oz)   SpO2 97%   BMI 25.53 kg/m²       Physical Exam    Results       Result Review :    The following data was reviewed by: AMANDA Chapa on 05/28/2025:    CBC w/diff          9/7/2024    20:54 4/22/2025    02:53 4/24/2025    02:57   CBC w/Diff   WBC 9.16  8.92  7.14    RBC 4.89  6.02  5.67    Hemoglobin 14.2  17.9  16.4    Hematocrit 42.4  53.5  49.7    MCV 86.7  88.9  87.7    MCH 29.0  29.7  28.9    MCHC 33.5  33.5  33.0    RDW 12.4  12.4  12.8    Platelets 303  268  308    Neutrophil Rel % 59.0  73.7  53.6    Immature Granulocyte Rel % 0.3  0.4  0.4    Lymphocyte Rel % 33.1  17.2  34.6    Monocyte Rel % 6.0  7.7  9.5    Eosinophil Rel % 1.3  0.7  1.3    Basophil Rel % 0.3  0.3  0.6      CMP          9/7/2024    20:54 4/22/2025    02:53 4/24/2025    02:57   CMP   Glucose 438  214  232    BUN 14  13  10    Creatinine 0.77  0.77  0.75    EGFR 89.5  89.0  91.8    Sodium 135  136  135    Potassium 4.4  3.9  3.4    Chloride 99  100  101    Calcium 9.3  9.7  9.4    Total Protein 6.8  7.9  7.2    Albumin 3.7  4.0  3.7    Globulin 3.1  3.9  3.5    Total Bilirubin 0.3  0.5  0.3    Alkaline Phosphatase 245  186  287    AST (SGOT) 13  25  101    ALT (SGPT) 18  42  400    Albumin/Globulin Ratio 1.2  1.0  1.1    BUN/Creatinine Ratio 18.2  16.9  13.3    Anion Gap 10.0  14.3  12.7        Lipase   Lipase   Date Value Ref Range Status   04/24/2025 24 13 - 60 U/L Final     Acute HEP Panel   Hepatitis B Surface Ag   Date Value Ref Range Status   04/24/2025 Non-Reactive Non-Reactive Final     Hep A IgM   Date Value Ref Range Status   04/24/2025 Non-Reactive Non-Reactive Final     Hep B C IgM   Date Value Ref Range Status   04/24/2025 Non-Reactive Non-Reactive Final "     Hepatitis C Ab   Date Value Ref Range Status   04/24/2025 Non-Reactive Non-Reactive Final       Results  Labs   - Stool studies: 10/03/2022, C. difficile negative, enteric bacterial and parasite panel negative, occult blood negative, fecal lactoferrin negative, pancreatic elastase less than 50 consistent with severe pancreatic insufficiency   - Stool studies: 04/22/2025, Negative for bacteria and C. difficile    Imaging   - CT abdomen and pelvis with contrast: 04/22/2025, Mildly dilated fluid-filled colon and to a lesser extent small bowel are seen. Uncomplicated infectious/inflammatory enterocolitis is suspected. Ischemic enterocolitis is thought to be less likely. There may be a generalized adynamic ileus. Nonspecific small scattered mesenteric lymph nodes are seen which are likely reactive in nature.           Assessment and Plan              Diagnoses and all orders for this visit:    1. Diarrhea, unspecified type (Primary)  -     Case Request; Standing  -     Case Request    2. Pancreatic insufficiency  -     Case Request; Standing  -     Case Request  -     Pancrelipase, Lip-Prot-Amyl, (Creon) [id number] units capsule delayed-release particles capsule; Take 2 capsules by mouth 3 (Three) Times a Day With Meals for 90 days. Take 2 with first bite of food.  1 with snacks  Dispense: 540 capsule; Refill: 1  -     Pancrelipase, Lip-Prot-Amyl, (Creon) [id number] units capsule delayed-release particles capsule; Take 1 capsule by mouth 3 (Three) Times a Day With Meals.  Dispense: 48 capsule; Refill: 0    3. Heartburn  -     Case Request; Standing  -     Case Request  -     famotidine (Pepcid) 40 MG tablet; Take 1 tablet by mouth every night at bedtime.  Dispense: 90 tablet; Refill: 1    Other orders  -     Follow Anesthesia Guidelines / Protocol; Future  -     Verify NPO; Standing  -     Verify Bowel Prep Was Successful; Standing  -     Give Tap Water Enema If Bowel Prep Insufficient; Standing  -     Obtain  Informed Consent; Standing  -     sodium-potassium-magnesium sulfates (Suprep Bowel Prep Kit) 17.5-3.13-1.6 GM/177ML solution oral solution; Take 2 bottles by mouth Take As Directed.  Dispense: 177 mL; Refill: 0        Assessment & Plan  1. Chronic diarrhea:  - Trial of Creon to aid digestion.  - Take Creon with the first bite of each meal or snack.  - Dosage: Two capsules for a regular meal, one for a snack.  - Provide a sample of Creon today.  - Prescription for Creon will be sent to pharmacy.  - Schedule a colonoscopy for further investigation.    2. Gastroesophageal reflux disease:  - Reports worsening acid reflux despite pantoprazole 40 mg.  - Schedule an upper endoscopy to examine the esophagus and stomach.  - Prescribe Pepcid for nighttime use in conjunction with morning pantoprazole.      ESOPHAGOGASTRODUODENOSCOPY (N/A), COLONOSCOPY (N/A)  The risk of the endoscopy were discussed in detail. Possible risks/complications, benefits, and alternatives to surgical or invasive procedure have been explained to patient and/or legal guardian; risks include bleeding, infection, and perforation. Patient has been evaluated and can tolerate anesthesia and/or sedation.       Follow Up     Follow Up   Return in about 6 months (around 11/28/2025) for Diarrhea, GERD.  Patient was given instructions and counseling regarding her condition or for health maintenance advice. Please see specific information pulled into the AVS if appropriate.

## 2025-06-09 ENCOUNTER — TELEPHONE (OUTPATIENT)
Dept: GASTROENTEROLOGY | Facility: CLINIC | Age: 60
End: 2025-06-09
Payer: COMMERCIAL

## 2025-06-09 NOTE — TELEPHONE ENCOUNTER
ENDO RECONCILIATION  Verify source of procedure(s): Office visit  If other, please list source: 5/28/25    TIME OUT-CONFIRM CORRECT PROCEDURE: EGD & Colonoscopy  Cardiology:  Pulmonology:  Blood thinner:   GLP-1: Trulicity   Additional DX/indication for procedure:     Please include any other notes relevant to endo reconciliation:     Trulicity

## 2025-07-21 ENCOUNTER — TELEPHONE (OUTPATIENT)
Dept: GASTROENTEROLOGY | Facility: CLINIC | Age: 60
End: 2025-07-21
Payer: COMMERCIAL

## 2025-07-21 NOTE — TELEPHONE ENCOUNTER
Rossy Valdivia  1965    Patient requested to cancel their EGD & Colonoscopy. I have offered to reschedule this patient and patient has declined.    Reason for cancelling: cost     Original date of case request: 7/24/2025    This procedure was ordered by AMANDA Turk for an important reason. I have thoroughly discussed with the patient that there are risks associated with not proceeding with the procedure at this time such as a delay in diagnosis, risk of incurable disease, or cancer. Patient verbalized understanding the risks discussed.    Patient plans to call us back to reschedule: No    Is there a follow up appointment that needs to be cancelled? Sending to provider     Has endo scheduling been notified? Yes    If yes, who did you speak to? [facility]     Has the case request been updated? Yes    Has a letter been mailed to the patient? Yes

## 2025-07-21 NOTE — TELEPHONE ENCOUNTER
Caller: Rossy Valdivia    Relationship to patient: Self    Best call back number: [phone]     Patient is needing: PATIENT HAS A PROCEDURE SCHEDULED FOR 7/24/25 BUT NEEDS TO CANCEL DUE TO HIGH OUT OF POCKET EXPENSE.  PATIENT STATES HER OUT OF POCKET COST IS OVER $3000.  SHE DOES NOT WANT TO RESCHEDULE AT THIS TIME.

## 2025-07-21 NOTE — LETTER
Sent via  Regular Mail            July 21, 2025    Rossy Valdivia  [address]  [address]  [place] [address]        Dear Ms. Valdivia,       Thank you for choosing [facility]. This letter is in regard to your recent visit to our office. Your provider recommended EGD & Colonoscopy . . It is important for you to understand that EGD & Colonoscopy is ordered to assure we are providing the best care possible. In some cases, we are trying to detect problems that could potentially have serious health consequences. Please contact us at [phone] and one of our representatives will assist you in scheduling your recommended appointment.      If you have completed the recommendation elsewhere please contact us as soon as possible so we can obtain the results and update your medical records.     We value you as a patient and thank you for allowing [facility] to provide for all of your healthcare needs.       Sincerely,        [facility]